# Patient Record
Sex: MALE | Race: WHITE | NOT HISPANIC OR LATINO | Employment: OTHER | URBAN - NONMETROPOLITAN AREA
[De-identification: names, ages, dates, MRNs, and addresses within clinical notes are randomized per-mention and may not be internally consistent; named-entity substitution may affect disease eponyms.]

---

## 2023-05-09 ENCOUNTER — OFFICE VISIT (OUTPATIENT)
Dept: PRIMARY CARE | Facility: CLINIC | Age: 83
End: 2023-05-09
Payer: MEDICARE

## 2023-05-09 VITALS
TEMPERATURE: 98.6 F | HEART RATE: 75 BPM | DIASTOLIC BLOOD PRESSURE: 70 MMHG | OXYGEN SATURATION: 96 % | WEIGHT: 190.2 LBS | SYSTOLIC BLOOD PRESSURE: 129 MMHG | RESPIRATION RATE: 16 BRPM

## 2023-05-09 DIAGNOSIS — Z00.00 ENCOUNTER FOR MEDICAL EXAMINATION TO ESTABLISH CARE: Primary | ICD-10-CM

## 2023-05-09 DIAGNOSIS — M25.511 RIGHT SHOULDER PAIN, UNSPECIFIED CHRONICITY: ICD-10-CM

## 2023-05-09 DIAGNOSIS — G45.9 TRANSIENT CEREBRAL ISCHEMIA, UNSPECIFIED TYPE: ICD-10-CM

## 2023-05-09 DIAGNOSIS — D48.5 NEOPLASM OF UNCERTAIN BEHAVIOR OF SCALP: ICD-10-CM

## 2023-05-09 DIAGNOSIS — H69.93 DYSFUNCTION OF BOTH EUSTACHIAN TUBES: ICD-10-CM

## 2023-05-09 DIAGNOSIS — E78.5 HYPERLIPIDEMIA, UNSPECIFIED HYPERLIPIDEMIA TYPE: ICD-10-CM

## 2023-05-09 PROBLEM — C61 PROSTATE CANCER (MULTI): Status: ACTIVE | Noted: 2018-05-10

## 2023-05-09 PROCEDURE — 1160F RVW MEDS BY RX/DR IN RCRD: CPT | Performed by: FAMILY MEDICINE

## 2023-05-09 PROCEDURE — 1159F MED LIST DOCD IN RCRD: CPT | Performed by: FAMILY MEDICINE

## 2023-05-09 PROCEDURE — 99214 OFFICE O/P EST MOD 30 MIN: CPT | Performed by: FAMILY MEDICINE

## 2023-05-09 PROCEDURE — 1036F TOBACCO NON-USER: CPT | Performed by: FAMILY MEDICINE

## 2023-05-09 RX ORDER — CLOPIDOGREL BISULFATE 75 MG/1
TABLET ORAL
COMMUNITY

## 2023-05-09 RX ORDER — GLUC/MSM/COLGN2/HYAL/ANTIARTH3 375-375-20
1 TABLET ORAL 3 TIMES DAILY
COMMUNITY

## 2023-05-09 RX ORDER — ASCORBIC ACID 250 MG
250 TABLET ORAL
COMMUNITY

## 2023-05-09 RX ORDER — TURMERIC 400 MG
CAPSULE ORAL
COMMUNITY

## 2023-05-09 RX ORDER — AMPICILLIN TRIHYDRATE 250 MG
CAPSULE ORAL
COMMUNITY

## 2023-05-09 ASSESSMENT — PAIN SCALES - GENERAL: PAINLEVEL: 0-NO PAIN

## 2023-05-09 ASSESSMENT — PATIENT HEALTH QUESTIONNAIRE - PHQ9
SUM OF ALL RESPONSES TO PHQ9 QUESTIONS 1 AND 2: 0
2. FEELING DOWN, DEPRESSED OR HOPELESS: NOT AT ALL
1. LITTLE INTEREST OR PLEASURE IN DOING THINGS: NOT AT ALL

## 2023-05-09 ASSESSMENT — ENCOUNTER SYMPTOMS: DIFFICULTY URINATING: 1

## 2023-05-09 NOTE — ASSESSMENT & PLAN NOTE
Episodic, resolves with Casa Maneuver (osteopathic manipulation)  Continue with conservative measures at this time  Patient established with ENT, he will return to them to discuss next steps for mucus production since unable to tolerate nasal spray due to epistaxis.

## 2023-05-09 NOTE — PATIENT INSTRUCTIONS
SCALP LESION  Referral to dermatology placed today    ETD/MUCUS PRODUCTION  Patient to return to ENT regarding mucus/nose issues.  Increase water intake to 64 ounces per day to help thin secretions.    For ophthalmology, recommend looking to see if Dr. Marquis is covered by insurance.    RIGHT SHOULDER PAIN  Exam and history consistent with arthritis and impingement syndrome  Patient reports symptoms overall improving, wishes to continue with conservative management at this time.  Activity modification and rest discussed and reviewed with patient.  Physical therapy recommended, patient can call for order if he wishes to pursue.

## 2023-05-09 NOTE — PROGRESS NOTES
Subjective   Patient ID: Tiburcio Bliss is a 83 y.o. male who presents for New Patient Visit (Former pt of Dr. Jayne Guillaume for 30+ years until her recent residential), Difficulty Urinating (Weak flow-seeing urology Dr. Costello/Scheduled for cytoscopy on 05/16/2023), Shoulder Pain (Right shoulder decreased ROM), and Hair/Scalp Problem.    HPI     Patient is here at new patient for establishment of care.  He was previous patient of Dr. Jayne Guillaume for over 30 years until her residential.    He has several concerns he wishes to discuss today:    Mucus, states has increased amounts of nasal secretions. He reports slimy is nature. Notes history of allergies, no relief with OTC antihistamine in the past. Notes hx of epistaxis that bleed profusely, required EMS/ER visit. Now only uses salt spray. He is established with ENT but cannot recall name at this time.  Right shoulder, notes mild injury x 10 days ago in which he heard popping noise when it happening. Now having decreased ROM.  Scalp, has raised area with flaking that has been present for quite some time. No pain or irritation other than when using comb. No bleeding other than when he experiences trauma to the area. Otherwise not bothersome. He does not have dermatologist that he follows with.  Ears issue. He notes he has occasional sound of scratching/static that occurs episodically in both ears. Occurs in the ear on the side of the head he was laying down on, resolves with massaging of ear.  Difficulty urinating, weak flow-seeing urology Dr. Costello. He is scheduled for cytoscopy on 05/16/2023.      Review of Systems   HENT:  Positive for congestion and tinnitus.    Genitourinary:  Positive for difficulty urinating.   Musculoskeletal:         Positive for right shoulder pain.   Skin:         Positive for scalp lesion   All other systems reviewed and are negative.      Objective   /70 (BP Location: Right arm, Patient Position: Sitting, BP Cuff Size:  Adult)   Pulse 75   Temp 37 °C (98.6 °F)   Resp 16   Wt 86.3 kg (190 lb 3.2 oz)   SpO2 96%     Physical Exam  Vitals and nursing note reviewed.   Constitutional:       General: He is not in acute distress.     Appearance: Normal appearance. He is not toxic-appearing.   HENT:      Head: Normocephalic and atraumatic.   Eyes:      Extraocular Movements: Extraocular movements intact.      Pupils: Pupils are equal, round, and reactive to light.   Neck:      Thyroid: No thyromegaly.      Vascular: No hepatojugular reflux or JVD.   Cardiovascular:      Rate and Rhythm: Normal rate and regular rhythm.      Heart sounds: No murmur heard.     No friction rub. No gallop.   Pulmonary:      Effort: Pulmonary effort is normal.      Breath sounds: Normal breath sounds. No wheezing, rhonchi or rales.   Abdominal:      General: Bowel sounds are normal. There is no distension.      Palpations: Abdomen is soft. There is no mass.      Tenderness: There is no abdominal tenderness. There is no guarding.   Musculoskeletal:      Right lower le+ Edema present.      Left lower le+ Edema present.      Comments:   Right Shoulder:  Negative Hawkin's  Mildly positive Neer  Strength 5/5 external rotation and sub scapularis lift off  Pain with 90 abduction, and abduction plus extension.   Lymphadenopathy:      Cervical: No cervical adenopathy.   Skin:     General: Skin is warm and dry.      Comments: 4 mm papule pinkish/flesh colored central scale to top of scalp, no vascularity.   Neurological:      General: No focal deficit present.      Mental Status: He is alert and oriented to person, place, and time.   Psychiatric:         Mood and Affect: Mood normal.         Behavior: Behavior normal.      Comments: Somewhat irritable, patient reports at baseline         Assessment/Plan   Problem List Items Addressed This Visit          Circulatory    Transient cerebral ischemia     Occurred May 2018, continue Plavix anticoagulation.  Routine  blood work to be done prior to next OV.         Relevant Orders    Follow Up In [facility] - PCP    CBC    Comprehensive Metabolic Panel       Musculoskeletal    Right shoulder pain     Exam and history consistent with arthritis and impingement syndrome  Patient reports symptoms overall improving, wishes to continue with conservative management at this time.  Activity modification and rest discussed and reviewed with patient.  Physical therapy recommended, patient can call for order if he wishes to pursue.            Other    Hyperlipidemia     Lifestyle managed, lipid panel ordered to be done prior to next OV.         Relevant Orders    Follow Up In [facility] - PCP    Lipid Panel    Neoplasm of uncertain behavior of scalp     Dermatology referral placed today.         Relevant Orders    Referral to Dermatology    Dysfunction of both eustachian tubes     Episodic, resolves with Casa Maneuver (osteopathic manipulation)  Continue with conservative measures at this time  Patient established with ENT, he will return to them to discuss next steps for mucus production since unable to tolerate nasal spray due to epistaxis.           Other Visit Diagnoses       Encounter for medical examination to establish care    -  Primary          Follow-up in 6 months for routine care.  Labs to be done prior.  Call for sooner follow-up if needed.     Time Spent  Prep time on day of patient encounter: 3 minutes  Time spent directly with patient, family or caregiver: 22 minutes  Additional Time Spent on Patient Care Activities: 0 minutes  Documentation Time: 6 minutes  Other Time Spent: 0 minutes  Total: 31 minutes      Scribe Attestation  By signing my name below, IMercedes Scribe   attest that this documentation has been prepared under the direction and in the presence of Akilah Caraballo DO.

## 2023-05-09 NOTE — ASSESSMENT & PLAN NOTE
Exam and history consistent with arthritis and impingement syndrome  Patient reports symptoms overall improving, wishes to continue with conservative management at this time.  Activity modification and rest discussed and reviewed with patient.  Physical therapy recommended, patient can call for order if he wishes to pursue.

## 2023-05-09 NOTE — ASSESSMENT & PLAN NOTE
Occurred May 2018, continue Plavix anticoagulation.  Routine blood work to be done prior to next OV.

## 2023-11-13 RX ORDER — TAMSULOSIN HYDROCHLORIDE 0.4 MG/1
0.4 CAPSULE ORAL
COMMUNITY
End: 2023-11-14 | Stop reason: WASHOUT

## 2023-11-14 ENCOUNTER — OFFICE VISIT (OUTPATIENT)
Dept: PRIMARY CARE | Facility: CLINIC | Age: 83
End: 2023-11-14
Payer: MEDICARE

## 2023-11-14 VITALS
WEIGHT: 187 LBS | RESPIRATION RATE: 16 BRPM | DIASTOLIC BLOOD PRESSURE: 73 MMHG | HEART RATE: 91 BPM | SYSTOLIC BLOOD PRESSURE: 111 MMHG | TEMPERATURE: 98.7 F | OXYGEN SATURATION: 94 %

## 2023-11-14 DIAGNOSIS — G45.9 TRANSIENT CEREBRAL ISCHEMIA, UNSPECIFIED TYPE: ICD-10-CM

## 2023-11-14 DIAGNOSIS — C61 PROSTATE CANCER (MULTI): ICD-10-CM

## 2023-11-14 DIAGNOSIS — R45.4 IRRITABILITY: ICD-10-CM

## 2023-11-14 DIAGNOSIS — R41.3 MEMORY IMPAIRMENT: ICD-10-CM

## 2023-11-14 DIAGNOSIS — H69.93 DYSFUNCTION OF BOTH EUSTACHIAN TUBES: ICD-10-CM

## 2023-11-14 DIAGNOSIS — E78.5 HYPERLIPIDEMIA, UNSPECIFIED HYPERLIPIDEMIA TYPE: Primary | ICD-10-CM

## 2023-11-14 DIAGNOSIS — M25.511 RIGHT SHOULDER PAIN, UNSPECIFIED CHRONICITY: ICD-10-CM

## 2023-11-14 DIAGNOSIS — I63.131: ICD-10-CM

## 2023-11-14 DIAGNOSIS — L82.1 SEBORRHEIC KERATOSIS: ICD-10-CM

## 2023-11-14 PROBLEM — N20.0 RENAL CALCULUS, LEFT: Status: ACTIVE | Noted: 2023-01-10

## 2023-11-14 PROBLEM — N20.1 RIGHT URETERAL CALCULUS: Status: ACTIVE | Noted: 2023-01-10

## 2023-11-14 PROCEDURE — 1159F MED LIST DOCD IN RCRD: CPT | Performed by: FAMILY MEDICINE

## 2023-11-14 PROCEDURE — 1036F TOBACCO NON-USER: CPT | Performed by: FAMILY MEDICINE

## 2023-11-14 PROCEDURE — 1126F AMNT PAIN NOTED NONE PRSNT: CPT | Performed by: FAMILY MEDICINE

## 2023-11-14 PROCEDURE — 1160F RVW MEDS BY RX/DR IN RCRD: CPT | Performed by: FAMILY MEDICINE

## 2023-11-14 PROCEDURE — 99214 OFFICE O/P EST MOD 30 MIN: CPT | Performed by: FAMILY MEDICINE

## 2023-11-14 NOTE — ASSESSMENT & PLAN NOTE
Exam and history consistent with arthritis, impingement syndrome, and possible tear or partial tear of rotator cuff. Pain is not improved from prior visit in May 2023 with rest and activity modification.    Ortho referral placed today.  Physical therapy recommended, referral placed today.  X-ray of right shoulder ordered today.

## 2023-11-14 NOTE — PROGRESS NOTES
Subjective   Patient ID: Tiburcio Bliss is a 83 y.o. male who presents for Follow-up (Pt presents for 6 month follow up- pt states that he is still having some shoulder pain, pt thinks that his hearing/memory are going out. ) and Flu Vaccine (Pt declines flu vaccine at this time. ).    HPI     Patient presents today for routine 6 month follow-up.  Patient would like to review labs.  Patient declines all vaccines.    Here with concern of memory decline. Does not recall having a prior work-up for reversible causes of memory decline. He follows regularly with neurology at [facility], goes every 6 months due to hx of carotid emboli, gets ultrasound with them. Did effect his peripheral vision but overall stable.    Continues with some irritability, states does have fights with his wife which does cause frustration on both ends. He is open to counseling, disinclined to pursue medication at this time.    He reports wife is concerned about patient's hearing, feels he is experiencing hearing loss, patient feels his hearing is fine but does feel he is having some cerumen impaction on right side, hears crackling sensation.    Reports post-nasal drainage and notable mucus production that is constant. He does not feel allergies are problematic, notes history but feels these have abated for the most part. Rare, occasional sneezing. He used Flonase once in past but not with consistency.    Patient has lesion to L ear he would like checked out.  Noticed a few weeks ago.  No pain, not bothersome.    He also notes several areas of skin damage.  Saw derm this past year for scalp lesion but they did not do skin cancer screening.    He continues with some right shoulder pain and wants to see about getting this fixed. Notes prior injury, caught his weight on this right arm when doing some cleaning, heard a tear, felt was getting better when seen in May 2023. Does find right arm is weaker than left, limited due to pain. Has trouble  putting shirt on and has impacted his golf swing. Recently started trying weights back, unable to do more than 2 lbs due to pain. No prior imaging, disinclined to have steroid injections.    Former, very remote smoker, over 60 years ago.  EtOH of about 1 drink per week on average  [facility]    ROUTINE VISIT  CHRONIC CONDITIONS:    -HLD  Lifestyle managed    -Hx of embolic stroke involving right carotid artery    -Hx of transient cerebral ischemia  Occurred May 2018  On Plavix anticoagulation    -Hx of kidney stones  5/2023 stone analysis showed calcium oxalate    -Hx of prostate cancer  Diagnosed ~12 years ago, s/p resection.  6/2022 PSA 0.02    Review of Systems   All other systems reviewed and are negative.      Objective   /73 (BP Location: Left arm, Patient Position: Sitting, BP Cuff Size: Small adult)   Pulse 91   Temp 37.1 °C (98.7 °F) (Temporal)   Resp 16   Wt 84.8 kg (187 lb)   SpO2 94%     Physical Exam  Vitals and nursing note reviewed.   Constitutional:       General: He is not in acute distress.     Appearance: Normal appearance. He is not toxic-appearing.   HENT:      Head: Normocephalic and atraumatic.   Neck:      Thyroid: No thyromegaly.      Vascular: No hepatojugular reflux or JVD.   Cardiovascular:      Rate and Rhythm: Normal rate and regular rhythm.      Heart sounds: No murmur heard.     No friction rub. No gallop.   Pulmonary:      Effort: Pulmonary effort is normal.      Breath sounds: Normal breath sounds. No wheezing, rhonchi or rales.   Musculoskeletal:      Right lower leg: No edema.      Left lower leg: No edema.      Comments:   Right Shoulder:  Positive Hawkin's  Positive Neer  Strength 5/5 external rotation and sub scapularis lift off  Pain with 90 abduction, and abduction plus extension.  Suprascapular wasting noted.   Lymphadenopathy:      Cervical: No cervical adenopathy.   Skin:     Comments: Skin tear right forearm  Seborrheic keratosis above left  ear.   Neurological:      General: No focal deficit present.      Mental Status: He is alert and oriented to person, place, and time.   Psychiatric:         Mood and Affect: Mood normal.         Behavior: Behavior normal.      Comments: Somewhat irritable, patient reports at baseline         Assessment/Plan   Problem List Items Addressed This Visit             ICD-10-CM    Hyperlipidemia - Primary E78.5     Lifestyle managed, lipid panel ordered today.         Relevant Orders    Lipid Panel    Prostate cancer (CMS/[facility]) C61     Diagnosed ~12 years ago, s/p resection.  6/2022 PSA 0.02         Right shoulder pain M25.511     Exam and history consistent with arthritis, impingement syndrome, and possible tear or partial tear of rotator cuff. Pain is not improved from prior visit in May 2023 with rest and activity modification.    Ortho referral placed today.  Physical therapy recommended, referral placed today.  X-ray of right shoulder ordered today.         Relevant Orders    Referral to Orthopaedic Surgery    Referral to Physical Therapy    XR shoulder right 2+ views    Dysfunction of both eustachian tubes H69.93     Exam and history consistent with Eustachian Tube Dysfunction.  Suspect related to underlying allergies due to constant post-nasal drainage/mucus production.  Start Flonase nasal spray (available over-the-counter), 1 spray each nostril, twice daily.  Nasal sprays such as Flonase or Nasacort are fine to use daily.         Transient cerebral ischemia G45.9     Occurred May 2018, continue Plavix anticoagulation.  Routine blood work ordered today.         Relevant Orders    CBC    Comprehensive Metabolic Panel    Embolic stroke involving right carotid artery (CMS/[facility]) I63.131     Follows regularly (every 6 months) with neurology at [facility].  Reports gets imaging done via neuro, has been stable.  Some residual deficit in peripheral vision but this remains stable.  Continues with Plavix, also with hx of  transient cerebral ischemia.         Seborrheic keratosis L82.1     Numerous throughout his body, reassured that these are benign, no further evaluation necessary.  Has seen derm recently this year (2023), can return to them if he wishes to get a skin survey.         Irritability R45.4     Disinclined to pursue medication at this time but open to pursuing counseling.  Referral to psychology (counseling) placed today.    May consider looking into Gideon White @ [facility]  [facility]  <[email or web address]/>  [address] [place] [address]   Phone [phone]    If not covered, see other resources below:    [facility]  [address] [place] [address]  [phone]    [facility]  [email or web address]  [address]  [place] [address]  Phone [phone]    [facility]  [address] [address]  [phone]    [facility]  [address] [address]  [phone]     [facility]   [email or web address]   [address] [place] [address]  [phone]    [facility]  [address], [address], [place] [address]  [phone]    [facility]  <[email or web address]  [address]   Phone [phone]    [facility]  <[email or web address]  [address] [place] [address]  [phone]    [facility]  <[email or web address]   [address], [place] [address]  Phone: [phone]          Relevant Orders    Referral to Psychology    Memory impairment R41.3     Labs for reversible causes of memory ordered today.  Will do SLUMS upon rooming at follow-up.  We discussed effects of alcohol on memory/cognition, may consider abstaining for 3-4 weeks to see if  this has any impact for patient.         Relevant Orders    Vitamin B12    TSH with reflex to Free T4 if abnormal       Follow-up in 3 months for recheck memory, shoulder pain, chronic conditions, review labs.  SLUMS and scales upon rooming.  Call for sooner follow-up if needed.     Scribe Attestation  By signing my name below, IMercedes Scribe   attest that this documentation has been prepared under the direction and in the presence of Akilah Caraballo DO.

## 2023-11-14 NOTE — ASSESSMENT & PLAN NOTE
Labs for reversible causes of memory ordered today.  Will do SLUMS upon rooming at follow-up.  We discussed effects of alcohol on memory/cognition, may consider abstaining for 3-4 weeks to see if this has any impact for patient.

## 2023-11-14 NOTE — ASSESSMENT & PLAN NOTE
Follows regularly (every 6 months) with neurology at [facility].  Reports gets imaging done via neuro, has been stable.  Some residual deficit in peripheral vision but this remains stable.  Continues with Plavix, also with hx of transient cerebral ischemia.

## 2023-11-14 NOTE — ASSESSMENT & PLAN NOTE
Disinclined to pursue medication at this time but open to pursuing counseling.  Referral to psychology (counseling) placed today.    May consider looking into Gideon White @ [employer]  [facility]  <[email or web address]  [address] [place] [address]   Phone [phone]    If not covered, see other resources below:    [facility]  [address] [place] [address]  [phone]    [facility]  [email or web address]  [address]  [place] [address]  Phone [phone]    [facility]  [address] [address]  [phone]    [facility]  [address] [address]  [phone]     [facility]   [email or web address]   [address] [place] [address]  [phone]    [facility]  [address] [place] [address], [address], [place] [address]  [phone]    [facility]  <[email or web address]  [address]   Phone [phone]    [facility]  <[email or web address]  [address] [place] [address]  [phone]    [facility]  <[email or web address]   [address] [address]  Phone: [phone]

## 2023-11-14 NOTE — ASSESSMENT & PLAN NOTE
Numerous throughout his body, reassured that these are benign, no further evaluation necessary.  Has seen derm recently this year (2023), can return to them if he wishes to get a skin survey.

## 2023-11-14 NOTE — PATIENT INSTRUCTIONS
FASTING LABS AND R SHOULDER X-RAY TO BE DONE AT PATIENT'S CONVENIENCE  Follow-up visit in 3 months, will do memory screening at that time.    Dysfunction of both eustachian tubes  Exam and history consistent with Eustachian Tube Dysfunction.  Suspect related to underlying allergies due to constant post-nasal drainage/mucus production.  Start Flonase nasal spray (available over-the-counter), 1 spray each nostril, twice daily.  Nasal sprays such as Flonase or Nasacort are fine to use daily.    Right shoulder pain  Exam and history consistent with arthritis, impingement syndrome, and possible tear or partial tear of rotator cuff. Pain is not improved from prior visit in May 2023 with rest and activity modification.    Ortho referral placed today.  Physical therapy recommended, referral placed today.  X-ray of right shoulder ordered today.    Seborrheic keratosis  Numerous throughout his body, reassured that these are benign, no further evaluation necessary.  Has seen derm recently this year (2023), can return to them if he wishes to get a skin survey.    Prostate cancer (CMS/[facility])  Diagnosed ~12 years ago, s/p resection.  6/2022 PSA 0.02    Irritability  Disinclined to pursue medication at this time but open to pursuing counseling.  Referral to psychology (counseling) placed today.    May consider looking into Gideon White @ [employer]  [facility]  <[email or web address]/>  [address] [place] [address]   Phone [phone]    If not covered, see other resources below:    [facility]  [address] [place] [address]  [phone]    [facility]  [email or web address]  [address]  [place] [address]  Phone [phone]    [facility]  [address] [address]  [phone]    [facility]  [address] [address]  [phone]     [facility]   [email or web address]   [address] [place] [address]  [phone]    [facility]  [address], [place] [address]  [phone]    [facility]  <[email or web address]  [address]   Phone [phone]    [facility]  <[email or web address]  [address] [place] [address]  [phone]    [facility]  <[email or web address]   [address], [place] [address]  Phone: [phone]     Memory impairment  Labs for reversible causes of memory ordered today.  Will do SLUMS upon rooming at follow-up.  We discussed effects of alcohol on memory/cognition, may consider abstaining for 3-4 weeks to see if this has any impact for patient.    Embolic stroke involving right carotid artery (CMS/HCC)  Follows regularly (every 6 months) with neurology at [facility].  Reports gets imaging done via neuro, has been stable.  Some residual deficit in peripheral vision but this remains stable.  Continues with Plavix, also with hx of transient cerebral ischemia.    Transient cerebral ischemia  Occurred May 2018, continue Plavix anticoagulation.  Routine blood work ordered today.    Hyperlipidemia  Lifestyle managed, lipid panel ordered today.

## 2023-11-14 NOTE — ASSESSMENT & PLAN NOTE
Exam and history consistent with Eustachian Tube Dysfunction.  Suspect related to underlying allergies due to constant post-nasal drainage/mucus production.  Start Flonase nasal spray (available over-the-counter), 1 spray each nostril, twice daily.  Nasal sprays such as Flonase or Nasacort are fine to use daily.

## 2023-11-20 ENCOUNTER — ANCILLARY PROCEDURE (OUTPATIENT)
Dept: RADIOLOGY | Facility: CLINIC | Age: 83
End: 2023-11-20
Payer: MEDICARE

## 2023-11-20 DIAGNOSIS — M25.511 RIGHT SHOULDER PAIN, UNSPECIFIED CHRONICITY: ICD-10-CM

## 2023-11-20 PROCEDURE — 73030 X-RAY EXAM OF SHOULDER: CPT | Mod: RIGHT SIDE | Performed by: RADIOLOGY

## 2023-11-20 PROCEDURE — 73030 X-RAY EXAM OF SHOULDER: CPT | Mod: RT,FY

## 2023-11-21 ENCOUNTER — LAB (OUTPATIENT)
Dept: LAB | Facility: LAB | Age: 83
End: 2023-11-21
Payer: MEDICARE

## 2023-11-21 DIAGNOSIS — M19.019 SHOULDER ARTHRITIS: Primary | ICD-10-CM

## 2023-11-21 DIAGNOSIS — E78.5 HYPERLIPIDEMIA, UNSPECIFIED HYPERLIPIDEMIA TYPE: ICD-10-CM

## 2023-11-21 DIAGNOSIS — G45.9 TRANSIENT CEREBRAL ISCHEMIA, UNSPECIFIED TYPE: ICD-10-CM

## 2023-11-21 DIAGNOSIS — R41.3 MEMORY IMPAIRMENT: ICD-10-CM

## 2023-11-21 LAB
ERYTHROCYTE [DISTWIDTH] IN BLOOD BY AUTOMATED COUNT: 12.6 % (ref 11.5–14.5)
HCT VFR BLD AUTO: 45.4 % (ref 41–52)
HGB BLD-MCNC: 14.3 G/DL (ref 13.5–17.5)
MCH RBC QN AUTO: 31.5 PG (ref 26–34)
MCHC RBC AUTO-ENTMCNC: 31.5 G/DL (ref 32–36)
MCV RBC AUTO: 100 FL (ref 80–100)
NRBC BLD-RTO: 0 /100 WBCS (ref 0–0)
PLATELET # BLD AUTO: 188 X10*3/UL (ref 150–450)
RBC # BLD AUTO: 4.54 X10*6/UL (ref 4.5–5.9)
WBC # BLD AUTO: 4.1 X10*3/UL (ref 4.4–11.3)

## 2023-11-21 PROCEDURE — 36415 COLL VENOUS BLD VENIPUNCTURE: CPT

## 2023-11-21 PROCEDURE — 80061 LIPID PANEL: CPT

## 2023-11-21 PROCEDURE — 84443 ASSAY THYROID STIM HORMONE: CPT

## 2023-11-21 PROCEDURE — 85027 COMPLETE CBC AUTOMATED: CPT

## 2023-11-21 PROCEDURE — 82607 VITAMIN B-12: CPT

## 2023-11-21 PROCEDURE — 80053 COMPREHEN METABOLIC PANEL: CPT

## 2023-11-22 LAB
ALBUMIN SERPL BCP-MCNC: 4.2 G/DL (ref 3.4–5)
ALP SERPL-CCNC: 70 U/L (ref 33–136)
ALT SERPL W P-5'-P-CCNC: 19 U/L (ref 10–52)
ANION GAP SERPL CALC-SCNC: 13 MMOL/L (ref 10–20)
AST SERPL W P-5'-P-CCNC: 19 U/L (ref 9–39)
BILIRUB SERPL-MCNC: 0.5 MG/DL (ref 0–1.2)
BUN SERPL-MCNC: 16 MG/DL (ref 6–23)
CALCIUM SERPL-MCNC: 10 MG/DL (ref 8.6–10.6)
CHLORIDE SERPL-SCNC: 107 MMOL/L (ref 98–107)
CHOLEST SERPL-MCNC: 185 MG/DL (ref 0–199)
CHOLESTEROL/HDL RATIO: 2.9
CO2 SERPL-SCNC: 28 MMOL/L (ref 21–32)
CREAT SERPL-MCNC: 0.95 MG/DL (ref 0.5–1.3)
GFR SERPL CREATININE-BSD FRML MDRD: 80 ML/MIN/1.73M*2
GLUCOSE SERPL-MCNC: 86 MG/DL (ref 74–99)
HDLC SERPL-MCNC: 64.7 MG/DL
LDLC SERPL CALC-MCNC: 110 MG/DL
NON HDL CHOLESTEROL: 120 MG/DL (ref 0–149)
POTASSIUM SERPL-SCNC: 4.2 MMOL/L (ref 3.5–5.3)
PROT SERPL-MCNC: 6.3 G/DL (ref 6.4–8.2)
SODIUM SERPL-SCNC: 144 MMOL/L (ref 136–145)
TRIGL SERPL-MCNC: 53 MG/DL (ref 0–149)
TSH SERPL-ACNC: 2.86 MIU/L (ref 0.44–3.98)
VIT B12 SERPL-MCNC: 245 PG/ML (ref 211–911)
VLDL: 11 MG/DL (ref 0–40)

## 2023-11-28 NOTE — PROGRESS NOTES
Physical Therapy    Physical Therapy Upper Extremity Evaluation    Patient Name: Tiburcio Bliss  MRN: [id number]  Today's Date: 11/29/2023  Time Calculation  Start Time: 1135  Stop Time: 1215  Time Calculation (min): 40 min    Current Problem  Problem List Items Addressed This Visit             ICD-10-CM    Right shoulder pain - Primary M25.511    Relevant Orders    Follow Up In Physical Therapy          Precautions  Precautions  Precautions Comment: None       Pain  Pain Assessment: 0-10  Pain Score: 0 - No pain  Pain Location: Shoulder  Pain at worst: 9-10/10    SUBJECTIVE:   Chief complaint:  Pt referred to PT for R shoulder pain   Began in spring 2023  He was cleaning bathroom and was planted on RUE and turned away. States he felt a tear  Pain is localized to posterolateral aspect of his shoulder   Hx of impingement   Decreased ROM     Ortho referral was placed by PCP as well. He will see them if PT does not help     Pt does not want to have any injections or surgery    Hand dominance:   L handed     Aggravating factors:  Reaching up, behind back, threading belt.     Alleviating factors:    Imaging:  XR R shoulder 11/20/23  FINDINGS:   Right shoulder, three views       There is severe joint space narrowing osteophytosis in the AC and   glenohumeral joints. There is no fracture or dislocation     Severe degenerative change about the right shoulder       Prior level of function:   Previously independent with all activity     Functional limitations:  Dressing, reaching, lifting     Work:  Retired     Patients goal:  Avoid surgery, reduce pain   Improve golf swing-improve follow through    Prior tx:  No tx recently  Performs arm exercises at home including supine flexion, chest press, flys and ER with 2.5 lb weight  OBJECTIVE:    Upper Extremity ROM: (WNL unless documented below)   ROM in Degrees RIGHT LEFT   Shoulder Flexion Mild pain     Shoulder Abduction Mild pain     Shoulder ER 40 pain     Shoulder IR T 9+       Upper Extremity Strength: (WNL unless documented below)   MMT 5/5 max  RIGHT LEFT   Shoulder Flexion 4+ pain     Shoulder Abduction 4 pain     Shoulder ER 4 pain     Shoulder IR 4+      Palpation: TTP over infraspinatus insertion     Special tests: (WNL unless documented below)   SHOULDER RIGHT LEFT   Hawkin's-Krystian +    Neer Impingement  -    Empty Can (Supraspinatus) +    Full Can (Supraspinatus)  +    Lateral Sonia Test (Supraspinatus)     Painful arc  -    Drop arm (Supra and Infra)     Speeds test (Bicep/Labrum)     Belly Press (Subscapularis)     Lift off (Subscapularis)     Apprehension (Anterior instability)     Relocation      Cross-body adduction (AC)     Jerk test (Posterior instability)      Crank Test/Isabel Test (Labrum)          TREATMENT:  Initial evaluation completed. Issued and reviewed HEP with pt that included:  Access Code: [email or web address]  URL: [email or web address]  Date: 11/29/2023  Prepared by: Shaila Luciano    Exercises  - Supine Shoulder Flexion with Free Weight  - 1 x daily - 7 x weekly - 2-3 sets - 10 reps  - Supine Shoulder Horizontal Abduction with Dumbbells  - 1 x daily - 7 x weekly - 2-3 sets - 10 reps  - Sidelying Shoulder ER with Towel and Dumbbell  - 1 x daily - 7 x weekly - 2-3 sets - 10 reps  - Seated Shoulder External Rotation AAROM with Dowel (Mirrored)  - 1 x daily - 7 x weekly - 2-3 sets - 10 reps  - Scaption with Dumbbells  - 1 x daily - 7 x weekly - 2-3 sets - 10 reps  - Standing Shoulder Abduction with Dumbbells  - 1 x daily - 7 x weekly - 2-3 sets - 10 reps    Patient Education  - Shoulder Impingement    Outcome Measure:  Other Measures  Disability of Arm Shoulder Hand (DASH): 18.18    ASSESSEMENT  The pt presents with signs and symptoms consistent with the physical therapy diagnosis of R shoulder pain that has been present since the spring. He had an incident where he was cleaning the bathroom and he was planted on his RUE and turned his body. Pt  demonstrates slight decrease in range with pain. He also has pain and weakness in RUE with +tests and measures consistent with impingement. Xrays revealed severe degenerative changes.   The pt will benefit from a therapy program to restore prior level of function, reduce pain, increase AROM, increase strength, and improve posture.    The physical therapy prognosis is good for the patient to achieve their goals.   The pt tolerated therapy treatment today well with no adverse effects.  Barriers to therapy include:  None    PLAN  The pt will be seen 1 time(s) a week in 2 weeks at which point he would like to continue with HEP independently    The pt has been educated about the risks and benefits of physical therapy including manual therapy treatments and gives consent for treatment.     The patient will benefit from physical therapy treatment to include: therapeutic exercises, therapeutic activities, neurological re-education, manual therapy, modalities, and a home exercise program.       Goals:  Active       PT Problem       Reduce pain at worst to 3/10 with all functional and recreational activity.        Start:  11/29/23    Expected End:  02/27/24            Increase ROM/flexibility to WFL to perform daily functional activities including dressing/bathing/grooming tasks       Start:  11/29/23    Expected End:  02/27/24            Increase by > or = 1/2 mm grade to improve lifting/carrying household objects, performing daily tasks without increased pain/compensation         Start:  11/29/23    Expected End:  02/27/24            Decrease Quick Dash score by > or = 8 points        Start:  11/29/23    Expected End:  02/27/24            Patient will demonstrate independence in home program for support of progression       Start:  11/29/23    Expected End:  02/27/24

## 2023-11-29 ENCOUNTER — EVALUATION (OUTPATIENT)
Dept: PHYSICAL THERAPY | Facility: CLINIC | Age: 83
End: 2023-11-29
Payer: MEDICARE

## 2023-11-29 DIAGNOSIS — M25.511 RIGHT SHOULDER PAIN, UNSPECIFIED CHRONICITY: Primary | ICD-10-CM

## 2023-11-29 PROCEDURE — 97161 PT EVAL LOW COMPLEX 20 MIN: CPT | Mod: GP | Performed by: PHYSICAL THERAPIST

## 2023-11-29 PROCEDURE — 97110 THERAPEUTIC EXERCISES: CPT | Mod: GP | Performed by: PHYSICAL THERAPIST

## 2023-11-29 ASSESSMENT — PATIENT HEALTH QUESTIONNAIRE - PHQ9
SUM OF ALL RESPONSES TO PHQ9 QUESTIONS 1 AND 2: 0
1. LITTLE INTEREST OR PLEASURE IN DOING THINGS: NOT AT ALL
2. FEELING DOWN, DEPRESSED OR HOPELESS: NOT AT ALL

## 2023-11-29 ASSESSMENT — PAIN SCALES - GENERAL: PAINLEVEL_OUTOF10: 0 - NO PAIN

## 2023-11-29 ASSESSMENT — PAIN - FUNCTIONAL ASSESSMENT: PAIN_FUNCTIONAL_ASSESSMENT: 0-10

## 2023-11-29 ASSESSMENT — ENCOUNTER SYMPTOMS
LOSS OF SENSATION IN FEET: 0
OCCASIONAL FEELINGS OF UNSTEADINESS: 0
DEPRESSION: 0

## 2023-12-13 ENCOUNTER — TREATMENT (OUTPATIENT)
Dept: PHYSICAL THERAPY | Facility: CLINIC | Age: 83
End: 2023-12-13
Payer: MEDICARE

## 2023-12-13 DIAGNOSIS — M25.511 RIGHT SHOULDER PAIN, UNSPECIFIED CHRONICITY: ICD-10-CM

## 2023-12-13 PROCEDURE — 97110 THERAPEUTIC EXERCISES: CPT | Mod: GP | Performed by: PHYSICAL THERAPIST

## 2023-12-13 ASSESSMENT — PAIN - FUNCTIONAL ASSESSMENT: PAIN_FUNCTIONAL_ASSESSMENT: 0-10

## 2023-12-13 NOTE — PROGRESS NOTES
Physical Therapy Treatment    Patient Name: Tiburcio Bliss  MRN: [id number]  Today's Date: 12/13/2023  Time Calculation  Start Time: 1132  Stop Time: 1215  Time Calculation (min): 43 min    Current Problem:  Problem List Items Addressed This Visit             ICD-10-CM    Right shoulder pain M25.511       Subjective   General:   Pt reports he did have some soreness with HEP that improved with rest.     Pain:  Pain Assessment: 0-10  Pain location: R shoulder    Precautions:  Precautions  Precautions Comment: None      Objective   No objective measures taken this visit    Treatment:    Therapeutic exercise  UBE L3 2 min/2 min   Pulleys flex, abd   Tband ER/IR YTB/GTB 2x10  Scaption  Abduction     Rows BTB 2x10  Lat pulldowns GTB 2x10    HEP updated:    Access Code: [email or web address]  URL: [email or web address]  Date: 12/13/2023  Prepared by: Shaila Luciano    Exercises  - Supine Shoulder Flexion with Free Weight  - 1 x daily - 4-5 x weekly - 2-3 sets - 10 reps  - Supine Shoulder Horizontal Abduction with Dumbbells  - 1 x daily - 4-5 x weekly - 2-3 sets - 10 reps  - Sidelying Shoulder ER with Towel and Dumbbell  - 1 x daily - 4-5 x weekly - 2-3 sets - 10 reps  - Seated Shoulder External Rotation AAROM with Dowel (Mirrored)  - 1 x daily - 4-5 x weekly - 2-3 sets - 10 reps  - Scaption with Dumbbells  - 1 x daily - 4-5 x weekly - 2-3 sets - 10 reps  - Standing Shoulder Abduction with Dumbbells  - 1 x daily - 4-5 x weekly - 2-3 sets - 10 reps  - Shoulder Internal Rotation with Resistance  - 1 x daily - 4-5 x weekly - 2-3 sets - 10 reps  - Shoulder External Rotation with Anchored Resistance (Mirrored)  - 1 x daily - 4-5 x weekly - 2-3 sets - 10 reps  - Standing Shoulder Row with Anchored Resistance  - 1 x daily - 4-5 x weekly - 2-3 sets - 10 reps  - Seated Lat Pull Down with Resistance - Elbows Bent  - 1 x daily - 4-5 x weekly - 2-3 sets - 10 reps    Assessment  Pt did well with exercise progression. Updated  and reviewed HEP with pt. Notes some R back strain with some exercises but did well otherwise. Pt motivated to continue to work on strength and ROM.     Plan  Continue to progress POC as tolerated by patient to improve strength, mobility and overall function    Goals:  Active       PT Problem       Reduce pain at worst to 3/10 with all functional and recreational activity.        Start:  11/29/23    Expected End:  02/27/24            Increase ROM/flexibility to WFL to perform daily functional activities including dressing/bathing/grooming tasks       Start:  11/29/23    Expected End:  02/27/24            Increase by > or = 1/2 mm grade to improve lifting/carrying household objects, performing daily tasks without increased pain/compensation         Start:  11/29/23    Expected End:  02/27/24            Decrease Quick Dash score by > or = 8 points        Start:  11/29/23    Expected End:  02/27/24            Patient will demonstrate independence in home program for support of progression       Start:  11/29/23    Expected End:  02/27/24

## 2024-01-30 ENCOUNTER — DOCUMENTATION (OUTPATIENT)
Dept: PHYSICAL THERAPY | Facility: CLINIC | Age: 84
End: 2024-01-30
Payer: MEDICARE

## 2024-01-30 NOTE — PROGRESS NOTES
Denies known Latex allergy or symptoms of Latex sensitivity.  Medications reviewed and updated.     Physical Therapy    Discharge Summary    Name: Tiburcio Bliss  MRN: [id number]  : 1940  Date: 24    Discharge Summary: PT    Discharge Information: Date of discharge 24, Date of last visit 23, Date of evaluation 23, Number of attended visits 2, Referred by Dr. Oliva, and Referred for R shoulder pain       Rehab Discharge Reason: Other Pt has not scheduled any additional PT appts. Pt was doing HEP as well as other exercises at home

## 2024-03-14 ENCOUNTER — APPOINTMENT (OUTPATIENT)
Dept: PRIMARY CARE | Facility: CLINIC | Age: 84
End: 2024-03-14
Payer: MEDICARE

## 2024-04-11 PROBLEM — Z85.46 HISTORY OF PROSTATE CANCER: Status: ACTIVE | Noted: 2018-05-10

## 2024-04-11 PROBLEM — Z86.73 CHRONIC RIGHT ARTERIAL ISCHEMIC STROKE, MCA (MIDDLE CEREBRAL ARTERY): Status: ACTIVE | Noted: 2018-05-20

## 2024-04-12 ENCOUNTER — APPOINTMENT (OUTPATIENT)
Dept: PRIMARY CARE | Facility: CLINIC | Age: 84
End: 2024-04-12
Payer: MEDICARE

## 2024-04-12 ENCOUNTER — OFFICE VISIT (OUTPATIENT)
Dept: PRIMARY CARE | Facility: CLINIC | Age: 84
End: 2024-04-12
Payer: MEDICARE

## 2024-04-12 VITALS
WEIGHT: 191.3 LBS | SYSTOLIC BLOOD PRESSURE: 142 MMHG | TEMPERATURE: 97.8 F | HEART RATE: 68 BPM | OXYGEN SATURATION: 95 % | DIASTOLIC BLOOD PRESSURE: 71 MMHG

## 2024-04-12 DIAGNOSIS — R41.3 MEMORY IMPAIRMENT: ICD-10-CM

## 2024-04-12 DIAGNOSIS — D72.819 LEUKOPENIA, UNSPECIFIED TYPE: ICD-10-CM

## 2024-04-12 DIAGNOSIS — R45.4 IRRITABILITY: ICD-10-CM

## 2024-04-12 DIAGNOSIS — G45.9 TRANSIENT CEREBRAL ISCHEMIA, UNSPECIFIED TYPE: ICD-10-CM

## 2024-04-12 DIAGNOSIS — Z86.73 CHRONIC RIGHT ARTERIAL ISCHEMIC STROKE, MCA (MIDDLE CEREBRAL ARTERY): Primary | ICD-10-CM

## 2024-04-12 DIAGNOSIS — E78.5 HYPERLIPIDEMIA, UNSPECIFIED HYPERLIPIDEMIA TYPE: ICD-10-CM

## 2024-04-12 DIAGNOSIS — Z85.46 HISTORY OF PROSTATE CANCER: ICD-10-CM

## 2024-04-12 PROCEDURE — 99214 OFFICE O/P EST MOD 30 MIN: CPT | Performed by: FAMILY MEDICINE

## 2024-04-12 PROCEDURE — 1160F RVW MEDS BY RX/DR IN RCRD: CPT | Performed by: FAMILY MEDICINE

## 2024-04-12 PROCEDURE — 1159F MED LIST DOCD IN RCRD: CPT | Performed by: FAMILY MEDICINE

## 2024-04-12 RX ORDER — SERTRALINE HYDROCHLORIDE 50 MG/1
TABLET, FILM COATED ORAL
Qty: 30 TABLET | Refills: 1 | Status: SHIPPED | OUTPATIENT
Start: 2024-04-12

## 2024-04-12 ASSESSMENT — ENCOUNTER SYMPTOMS
APPETITE CHANGE: 0
FATIGUE: 1

## 2024-04-12 NOTE — ASSESSMENT & PLAN NOTE
Follows regularly (every 6 months) with neurology at [facility].  Follows regularly with vascular for imaging of right ICA occlusion which has been historically stable.  Some residual deficit in peripheral vision but this remains stable.  Continues with Plavix  Revisited importance of good BP control.  Diet and exercise recommendations revisited.
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
## 2024-04-12 NOTE — PROGRESS NOTES
Subjective   Patient ID: Tiburcio Bliss is a 84 y.o. male who presents for No chief complaint on file..    HPI     Patient presents today for follow-up memory, shoulder pain, chronic conditions, review labs. SLUMS and scales to be done upon rooming today.    SLUMS:    PHQ-9:  XAVI-7:    Patient concerns:      CHRONIC CONDITIONS:    Memory impairment  11/2023 B-12 and TSH wnl    SLUMS to be completed today    Previously (11/2023 OV) discussed effects of alcohol on memory/cognition, may consider abstaining for 3-4 weeks to see if this has any impact for patient.    Irritability  Disinclined to pursue medication but open to pursuing counseling.  Referral to psychology (counseling) placed 11/14/2023    Hyperlipidemia  Lifestyle managed  Not on statin but takes red yeast rice.    11/2023 TC/HDL ratio 2.9, , trig 53     Chronic right arterial ischemic stroke, MCA (middle cerebral artery)  Follows with neurology at [facility]  On plavix anticoagulation.    5/10/2018: Right MCA stroke, right ICA occlusion, Left carotid stenosis    5/11/2018 MRI of Brain: acute infarcts basal ganglia, subinsular white matter, and temporal lobe white matter. Slow flow or occlusion of right ICA.    Per neurology adequate bp control = sbp 120-140' avoid hypotension due to risk of watershed with right ica occlusion.    Carotid artery occlusion, R  Follows with [facility] vascular, last visit 1/2024 and noted to be stable.     12/2023 carotid duplex showed Right ICA: Occluded and Left ICA: <50% stenosis. This is stable from previous testing.     Repeat carotid duplex 1 year per vascular    Hx of kidney stones  5/2023 stone analysis showed calcium oxalate     Hx of prostate cancer  Diagnosed ~12 years ago, s/p resection.  6/2022 PSA 0.02      Review of Systems   All other systems reviewed and are negative.      Objective   There were no vitals taken for this visit.    Physical Exam  Vitals and nursing note reviewed.   Constitutional:       General: He is  not in acute distress.     Appearance: Normal appearance. He is not toxic-appearing.   HENT:      Head: Normocephalic and atraumatic.   Eyes:      Extraocular Movements: Extraocular movements intact.      Pupils: Pupils are equal, round, and reactive to light.   Neck:      Thyroid: No thyromegaly.      Vascular: No hepatojugular reflux or JVD.   Cardiovascular:      Rate and Rhythm: Normal rate and regular rhythm.      Heart sounds: No murmur heard.     No friction rub. No gallop.   Pulmonary:      Effort: Pulmonary effort is normal.      Breath sounds: Normal breath sounds. No wheezing, rhonchi or rales.   Abdominal:      General: Bowel sounds are normal. There is no distension.      Palpations: Abdomen is soft. There is no mass.      Tenderness: There is no abdominal tenderness. There is no guarding.   Musculoskeletal:      Right lower le+ Edema present.      Left lower le+ Edema present.   Lymphadenopathy:      Cervical: No cervical adenopathy.   Skin:     General: Skin is warm and dry.   Neurological:      General: No focal deficit present.      Mental Status: He is alert and oriented to person, place, and time.   Psychiatric:         Mood and Affect: Mood normal.         Behavior: Behavior normal.      Comments: Somewhat irritable, patient reports at baseline         Assessment/Plan   {Assess/PlanSmartLinks:14039}

## 2024-04-12 NOTE — ASSESSMENT & PLAN NOTE
11/2023 TC/HDL ratio 2.9, , trig 53  Goal TC/HDL ratio 3.4 or less, LDL 99 or less,  or less, and TRIG 150 or less.     Lifestyle managed  Not on statin but takes red yeast rice.  Diet and exercise recommendations revisited.     To lower this with lifestyle measures:  -make sure you are avoiding refined carbs such as breads, pasta, cereal, candy, soda,  nutrition bars, granola, chips, and sugar sweetened beverages.      -eat 5- 7 servings daily of veggies,  healthy protein such as chicken, fish,  beans, and eggs, and include healthy fats in your diet such as seeds, nuts, olive oil, avocados, and salmon.   -exercise 4 - 6 days per week as you are able, 150 minutes total weekly divided up is recommended with 3-4 of those days including resistance/strength training.  -consider taking the fiber product psyllium capsules or powder 2 times daily (generic brand is fine) , or a brand name psyllium such as [employer] or Metamucil.  -consider also adding plant stanols and sterols such as Nature Made CholestOff, available over the counter.

## 2024-04-12 NOTE — LETTER
April 12, 2024     Patient: Tiburcio Bliss   YOB: 1940   Date of Visit: 4/12/2024       To Whom It May Concern:    Tiburcio Bliss was seen in my clinic on 4/12/2024 at 11:00 am. Please excuse Tiburcio for his absence from work on this day to make the appointment.    If you have any questions or concerns, please don't hesitate to call.         Sincerely,         Akilah Caraballo,         CC: No Recipients

## 2024-04-12 NOTE — ASSESSMENT & PLAN NOTE
11/2023 B-12 and TSH wnl - labs unremarkable for reversible causes of memory.    SLUMS in office today was **

## 2024-04-12 NOTE — PROGRESS NOTES
Patient has brought in BP machine for validation.  Arm tested:   Office Cuff size:  Step 1:  A Patient machine:       BP: 157/85                 B Patient machine:       BP:  150/85                C Office machine:        BP: 142/71                D Patient machine:      BP: 163/94               E Office machine:        BP: 154/60                    Step 2-average of B & D=156.5  Difference between Step 2 average and C reading=  If the difference is:14.5  Less than 5mm Hg, machine is validated  Between 6 and 10 Hg, proceed to step 3  Greater than 10mm Hg-replace device and return to office for validation    Step 3 (if applicable)-average of C and E=148  Difference between Step 3 average and D reading=  If the difference is:15  Less than or equal to 10mm Hg-machine is validated  Greater than 10mm Hg, replace device and return to office for validation       Patient informed BP cuff/machine is (VALID, INVALID): INVALID

## 2024-04-12 NOTE — PATIENT INSTRUCTIONS
Assessment/Plan        Slightly decreased white blood cell count-this can be seen in many healthy individuals as a normal variant.  Repeat blood work with differential has been ordered to look at blood cell lines today.    Reversible causes of memory loss have been looked at such as B12 and thyroid.  Those were in the normal range.    Irritability-counseling services are recommended for patient.  Patient is advised to call [facility] and relay to them that he would like to speak to someone about frustration from marital challenges.    Sertraline, which is Zoloft, has been sent into patient's pharmacy.  Patient is to take half of a tablet for the first 10 to 12 days, then a full tablet thereafter.  Refills have been provided.  Side effect profile was reviewed with patient.    Please make a follow-up office visit with me in 8 to 12 weeks for a recheck irritability/scales/catch up on counseling feedback, and for blood work review.    If your blood work is abnormal and needs clinical intervention prior to your follow-up, we will reach out and let you know.    PLEASE CALL [facility]  AND SET UP A SESSION FOR COUNSELING TO DISCUSS FRUSTRATIONS, COPING MECHANISMS, ETC     [phone]

## 2024-04-15 ENCOUNTER — TELEPHONE (OUTPATIENT)
Dept: PRIMARY CARE | Facility: CLINIC | Age: 84
End: 2024-04-15
Payer: MEDICARE

## 2024-04-15 DIAGNOSIS — R21 RASH: Primary | ICD-10-CM

## 2024-04-15 RX ORDER — TRIAMCINOLONE ACETONIDE 1 MG/G
CREAM TOPICAL 2 TIMES DAILY
Qty: 30 G | Refills: 0 | Status: SHIPPED | OUTPATIENT
Start: 2024-04-15

## 2024-04-15 ASSESSMENT — PATIENT HEALTH QUESTIONNAIRE - PHQ9
9. THOUGHTS THAT YOU WOULD BE BETTER OFF DEAD, OR OF HURTING YOURSELF: NOT AT ALL
SUM OF ALL RESPONSES TO PHQ9 QUESTIONS 1 AND 2: 2
2. FEELING DOWN, DEPRESSED OR HOPELESS: NOT AT ALL
6. FEELING BAD ABOUT YOURSELF - OR THAT YOU ARE A FAILURE OR HAVE LET YOURSELF OR YOUR FAMILY DOWN: SEVERAL DAYS
3. TROUBLE FALLING OR STAYING ASLEEP OR SLEEPING TOO MUCH: SEVERAL DAYS
4. FEELING TIRED OR HAVING LITTLE ENERGY: NOT AT ALL
8. MOVING OR SPEAKING SO SLOWLY THAT OTHER PEOPLE COULD HAVE NOTICED. OR THE OPPOSITE, BEING SO FIGETY OR RESTLESS THAT YOU HAVE BEEN MOVING AROUND A LOT MORE THAN USUAL: SEVERAL DAYS
10. IF YOU CHECKED OFF ANY PROBLEMS, HOW DIFFICULT HAVE THESE PROBLEMS MADE IT FOR YOU TO DO YOUR WORK, TAKE CARE OF THINGS AT HOME, OR GET ALONG WITH OTHER PEOPLE: SOMEWHAT DIFFICULT
7. TROUBLE CONCENTRATING ON THINGS, SUCH AS READING THE NEWSPAPER OR WATCHING TELEVISION: NOT AT ALL
5. POOR APPETITE OR OVEREATING: NOT AT ALL
1. LITTLE INTEREST OR PLEASURE IN DOING THINGS: MORE THAN HALF THE DAYS
SUM OF ALL RESPONSES TO PHQ QUESTIONS 1-9: 5

## 2024-04-15 ASSESSMENT — ANXIETY QUESTIONNAIRES
6. BECOMING EASILY ANNOYED OR IRRITABLE: SEVERAL DAYS
4. TROUBLE RELAXING: NOT AT ALL
3. WORRYING TOO MUCH ABOUT DIFFERENT THINGS: NOT AT ALL
IF YOU CHECKED OFF ANY PROBLEMS ON THIS QUESTIONNAIRE, HOW DIFFICULT HAVE THESE PROBLEMS MADE IT FOR YOU TO DO YOUR WORK, TAKE CARE OF THINGS AT HOME, OR GET ALONG WITH OTHER PEOPLE: SOMEWHAT DIFFICULT
7. FEELING AFRAID AS IF SOMETHING AWFUL MIGHT HAPPEN: NOT AT ALL
1. FEELING NERVOUS, ANXIOUS, OR ON EDGE: SEVERAL DAYS
GAD7 TOTAL SCORE: 3
2. NOT BEING ABLE TO STOP OR CONTROL WORRYING: SEVERAL DAYS
5. BEING SO RESTLESS THAT IT IS HARD TO SIT STILL: NOT AT ALL

## 2024-04-15 NOTE — TELEPHONE ENCOUNTER
Patient called and said that there was something supposed to be sent in for a rash he has and he never got it. Please send to the [facility] in [place].     He also said that he decided to not take the zoloft that was sent in because of the side effects.

## 2024-04-15 NOTE — TELEPHONE ENCOUNTER
Sent in sorry about that     Please reconsider the zoloft -  if you have side effects you just stop it and they stop       - up to him

## 2024-04-16 NOTE — TELEPHONE ENCOUNTER
Pt cb. I advised him the cream was sent to the pharmacy. He is not interested in starting the Zoloft at this time. Pt states it has to many side effects.

## 2024-04-22 ENCOUNTER — LAB (OUTPATIENT)
Dept: LAB | Facility: LAB | Age: 84
End: 2024-04-22
Payer: MEDICARE

## 2024-04-22 DIAGNOSIS — D72.819 LEUKOPENIA, UNSPECIFIED TYPE: ICD-10-CM

## 2024-04-22 PROCEDURE — 85025 COMPLETE CBC W/AUTO DIFF WBC: CPT

## 2024-04-22 PROCEDURE — 36415 COLL VENOUS BLD VENIPUNCTURE: CPT

## 2024-04-23 LAB
BASOPHILS # BLD AUTO: 0.04 X10*3/UL (ref 0–0.1)
BASOPHILS NFR BLD AUTO: 0.9 %
EOSINOPHIL # BLD AUTO: 0.18 X10*3/UL (ref 0–0.4)
EOSINOPHIL NFR BLD AUTO: 3.9 %
ERYTHROCYTE [DISTWIDTH] IN BLOOD BY AUTOMATED COUNT: 12.7 % (ref 11.5–14.5)
HCT VFR BLD AUTO: 47.4 % (ref 41–52)
HGB BLD-MCNC: 15.1 G/DL (ref 13.5–17.5)
IMM GRANULOCYTES # BLD AUTO: 0.02 X10*3/UL (ref 0–0.5)
IMM GRANULOCYTES NFR BLD AUTO: 0.4 % (ref 0–0.9)
LYMPHOCYTES # BLD AUTO: 0.94 X10*3/UL (ref 0.8–3)
LYMPHOCYTES NFR BLD AUTO: 20.5 %
MCH RBC QN AUTO: 31.4 PG (ref 26–34)
MCHC RBC AUTO-ENTMCNC: 31.9 G/DL (ref 32–36)
MCV RBC AUTO: 99 FL (ref 80–100)
MONOCYTES # BLD AUTO: 0.38 X10*3/UL (ref 0.05–0.8)
MONOCYTES NFR BLD AUTO: 8.3 %
NEUTROPHILS # BLD AUTO: 3.03 X10*3/UL (ref 1.6–5.5)
NEUTROPHILS NFR BLD AUTO: 66 %
NRBC BLD-RTO: 0 /100 WBCS (ref 0–0)
PLATELET # BLD AUTO: 179 X10*3/UL (ref 150–450)
RBC # BLD AUTO: 4.81 X10*6/UL (ref 4.5–5.9)
WBC # BLD AUTO: 4.6 X10*3/UL (ref 4.4–11.3)

## 2024-05-14 ENCOUNTER — HOSPITAL ENCOUNTER (OUTPATIENT)
Dept: RADIOLOGY | Facility: CLINIC | Age: 84
Discharge: HOME | End: 2024-05-14
Payer: MEDICARE

## 2024-05-14 DIAGNOSIS — R41.3 MEMORY IMPAIRMENT: ICD-10-CM

## 2024-05-14 PROCEDURE — 70450 CT HEAD/BRAIN W/O DYE: CPT

## 2024-05-14 PROCEDURE — 70450 CT HEAD/BRAIN W/O DYE: CPT | Performed by: RADIOLOGY

## 2024-06-25 ENCOUNTER — APPOINTMENT (OUTPATIENT)
Dept: PRIMARY CARE | Facility: CLINIC | Age: 84
End: 2024-06-25
Payer: MEDICARE

## 2024-09-03 ENCOUNTER — LAB (OUTPATIENT)
Dept: LAB | Facility: LAB | Age: 84
End: 2024-09-03
Payer: MEDICARE

## 2024-09-03 ENCOUNTER — APPOINTMENT (OUTPATIENT)
Dept: PRIMARY CARE | Facility: CLINIC | Age: 84
End: 2024-09-03
Payer: MEDICARE

## 2024-09-03 VITALS
BODY MASS INDEX: 23.74 KG/M2 | RESPIRATION RATE: 14 BRPM | HEART RATE: 87 BPM | SYSTOLIC BLOOD PRESSURE: 142 MMHG | TEMPERATURE: 99 F | WEIGHT: 185 LBS | HEIGHT: 74 IN | OXYGEN SATURATION: 95 % | DIASTOLIC BLOOD PRESSURE: 64 MMHG

## 2024-09-03 DIAGNOSIS — C61 MALIGNANT NEOPLASM OF PROSTATE (MULTI): ICD-10-CM

## 2024-09-03 DIAGNOSIS — R10.9 ABDOMINAL PAIN, UNSPECIFIED ABDOMINAL LOCATION: ICD-10-CM

## 2024-09-03 DIAGNOSIS — G45.9 TRANSIENT CEREBRAL ISCHEMIA, UNSPECIFIED TYPE: ICD-10-CM

## 2024-09-03 DIAGNOSIS — I77.811 ABDOMINAL AORTIC ECTASIA (CMS-HCC): ICD-10-CM

## 2024-09-03 DIAGNOSIS — R45.4 IRRITABILITY: ICD-10-CM

## 2024-09-03 DIAGNOSIS — R41.3 MEMORY IMPAIRMENT: ICD-10-CM

## 2024-09-03 DIAGNOSIS — E78.5 HYPERLIPIDEMIA, UNSPECIFIED HYPERLIPIDEMIA TYPE: ICD-10-CM

## 2024-09-03 DIAGNOSIS — Z13.89 ENCOUNTER FOR SCREENING FOR OTHER DISORDER: ICD-10-CM

## 2024-09-03 DIAGNOSIS — Z86.73 CHRONIC RIGHT ARTERIAL ISCHEMIC STROKE, MCA (MIDDLE CEREBRAL ARTERY): ICD-10-CM

## 2024-09-03 DIAGNOSIS — Z12.5 SCREENING FOR PROSTATE CANCER: ICD-10-CM

## 2024-09-03 DIAGNOSIS — Z00.00 MEDICARE ANNUAL WELLNESS VISIT, SUBSEQUENT: Primary | ICD-10-CM

## 2024-09-03 DIAGNOSIS — R03.0 ELEVATED BP WITHOUT DIAGNOSIS OF HYPERTENSION: ICD-10-CM

## 2024-09-03 DIAGNOSIS — L82.1 SEBORRHEIC KERATOSIS: ICD-10-CM

## 2024-09-03 PROBLEM — D72.819 LEUKOPENIA: Status: RESOLVED | Noted: 2024-04-22 | Resolved: 2024-09-03

## 2024-09-03 PROBLEM — H69.93 DYSFUNCTION OF BOTH EUSTACHIAN TUBES: Status: RESOLVED | Noted: 2023-05-09 | Resolved: 2024-09-03

## 2024-09-03 PROBLEM — N20.1 RIGHT URETERAL CALCULUS: Status: RESOLVED | Noted: 2023-01-10 | Resolved: 2024-09-03

## 2024-09-03 PROBLEM — I63.131 CEREBROVASCULAR ACCIDENT (CVA) DUE TO EMBOLISM OF RIGHT CAROTID ARTERY (MULTI): Status: RESOLVED | Noted: 2018-05-20 | Resolved: 2024-09-03

## 2024-09-03 PROCEDURE — 36415 COLL VENOUS BLD VENIPUNCTURE: CPT

## 2024-09-03 PROCEDURE — 1160F RVW MEDS BY RX/DR IN RCRD: CPT | Performed by: FAMILY MEDICINE

## 2024-09-03 PROCEDURE — 80048 BASIC METABOLIC PNL TOTAL CA: CPT

## 2024-09-03 PROCEDURE — 1170F FXNL STATUS ASSESSED: CPT | Performed by: FAMILY MEDICINE

## 2024-09-03 PROCEDURE — 1123F ACP DISCUSS/DSCN MKR DOCD: CPT | Performed by: FAMILY MEDICINE

## 2024-09-03 PROCEDURE — 1036F TOBACCO NON-USER: CPT | Performed by: FAMILY MEDICINE

## 2024-09-03 PROCEDURE — 85027 COMPLETE CBC AUTOMATED: CPT

## 2024-09-03 PROCEDURE — 83690 ASSAY OF LIPASE: CPT

## 2024-09-03 PROCEDURE — 1159F MED LIST DOCD IN RCRD: CPT | Performed by: FAMILY MEDICINE

## 2024-09-03 PROCEDURE — 80061 LIPID PANEL: CPT

## 2024-09-03 PROCEDURE — G0103 PSA SCREENING: HCPCS

## 2024-09-03 PROCEDURE — G0439 PPPS, SUBSEQ VISIT: HCPCS | Performed by: FAMILY MEDICINE

## 2024-09-03 ASSESSMENT — ANXIETY QUESTIONNAIRES
3. WORRYING TOO MUCH ABOUT DIFFERENT THINGS: MORE THAN HALF THE DAYS
7. FEELING AFRAID AS IF SOMETHING AWFUL MIGHT HAPPEN: SEVERAL DAYS
GAD7 TOTAL SCORE: 7
1. FEELING NERVOUS, ANXIOUS, OR ON EDGE: SEVERAL DAYS
6. BECOMING EASILY ANNOYED OR IRRITABLE: SEVERAL DAYS
4. TROUBLE RELAXING: NOT AT ALL
2. NOT BEING ABLE TO STOP OR CONTROL WORRYING: SEVERAL DAYS
IF YOU CHECKED OFF ANY PROBLEMS ON THIS QUESTIONNAIRE, HOW DIFFICULT HAVE THESE PROBLEMS MADE IT FOR YOU TO DO YOUR WORK, TAKE CARE OF THINGS AT HOME, OR GET ALONG WITH OTHER PEOPLE: SOMEWHAT DIFFICULT
5. BEING SO RESTLESS THAT IT IS HARD TO SIT STILL: SEVERAL DAYS

## 2024-09-03 ASSESSMENT — ACTIVITIES OF DAILY LIVING (ADL)
DOING_HOUSEWORK: INDEPENDENT
GROCERY_SHOPPING: INDEPENDENT
DRESSING: INDEPENDENT
MANAGING_FINANCES: INDEPENDENT
TAKING_MEDICATION: INDEPENDENT
BATHING: INDEPENDENT

## 2024-09-03 ASSESSMENT — PATIENT HEALTH QUESTIONNAIRE - PHQ9
4. FEELING TIRED OR HAVING LITTLE ENERGY: NOT AT ALL
SUM OF ALL RESPONSES TO PHQ QUESTIONS 1-9: 2
9. THOUGHTS THAT YOU WOULD BE BETTER OFF DEAD, OR OF HURTING YOURSELF: NOT AT ALL
SUM OF ALL RESPONSES TO PHQ9 QUESTIONS 1 AND 2: 1
5. POOR APPETITE OR OVEREATING: NOT AT ALL
10. IF YOU CHECKED OFF ANY PROBLEMS, HOW DIFFICULT HAVE THESE PROBLEMS MADE IT FOR YOU TO DO YOUR WORK, TAKE CARE OF THINGS AT HOME, OR GET ALONG WITH OTHER PEOPLE: NOT DIFFICULT AT ALL
1. LITTLE INTEREST OR PLEASURE IN DOING THINGS: SEVERAL DAYS
3. TROUBLE FALLING OR STAYING ASLEEP OR SLEEPING TOO MUCH: NOT AT ALL
8. MOVING OR SPEAKING SO SLOWLY THAT OTHER PEOPLE COULD HAVE NOTICED. OR THE OPPOSITE, BEING SO FIGETY OR RESTLESS THAT YOU HAVE BEEN MOVING AROUND A LOT MORE THAN USUAL: NOT AT ALL
2. FEELING DOWN, DEPRESSED OR HOPELESS: NOT AT ALL
6. FEELING BAD ABOUT YOURSELF - OR THAT YOU ARE A FAILURE OR HAVE LET YOURSELF OR YOUR FAMILY DOWN: SEVERAL DAYS
7. TROUBLE CONCENTRATING ON THINGS, SUCH AS READING THE NEWSPAPER OR WATCHING TELEVISION: NOT AT ALL

## 2024-09-03 ASSESSMENT — LIFESTYLE VARIABLES: HOW OFTEN DO YOU HAVE A DRINK CONTAINING ALCOHOL: 4 OR MORE TIMES A WEEK

## 2024-09-03 NOTE — PATIENT INSTRUCTIONS
Malignant neoplasm of prostate (Multi)  H/o  Diagnosed around 3316-5521, s/p resection.  6/2022 PSA 0.02    Medicare annual wellness visit, subsequent  Vaccines and screenings reviewed.  Questionnaires completed.  Health and wellness topics reviewed.  Diet and exercise recommendations revisited.  Routine blood work ordered today.     VACCINES:  - Patient declines all vaccines at this time  -TDAP is due, recommended every 10 years, patient defers  -Shingrix recommended, patient declines  -Pneumonia vaccine recommended, patient declines.    SCREENINGS:  -patient has graduated from routine prostate and colon cancer screenings and continues to defer these.    LIFESTYLE MEASURES  -consider increasing protein intake provided no issues with kidneys to 1 gram per 1 pound of ideal body weight per not to exceed 150 gram per day. May have to supplement with a protein powder to achieve this goal.  -make sure you are avoiding refined carbs such as breads, pasta, cereal, candy, soda,  nutrition bars, granola, chips, and sugar sweetened beverages.      -eat 5- 7 servings daily of veggies,  healthy protein such as chicken, fish,  beans, and eggs, and include healthy fats in your diet such as seeds, nuts, olive oil, avocados, and salmon.   -exercise 4 - 6 days per week as you are able, 150 minutes total weekly divided up is recommended with 3-4 of those days including resistance/strength training.  -Vitamin D is recommended at 1000 - 5000 IU international units daily.   -Always wear sunscreen when you have sun exposure.  -64 oz of water is recommended daily.  -Dental visits recommended every 6 months.  -Eye exam recommended every 2 years, for those with vision problems every year.      Seborrheic keratosis  Numerous throughout his body, reassured that these are benign, no further evaluation necessary.    Memory impairment  11/2023 B-12 and TSH wnl  04/2024 CBC wnl    05/2024 CT Head: Moderate diffuse parenchymal volume loss without  specific lobar pattern to suggest an underlying neurodegenerative process. Mild microangiopathic changes. No acute intracranial abnormality.    05/2024 patient deferred neurology referral    09/3/2024 SLUMS: 26 out of 30    Slums today reviewed and discussed with patient, this is reassuring.  No further evaluation indicated at this time.    Abdominal aortic ectasia (CMS-HCC)  12/2022 CT A/P: Diffuse atherosclerotic disease. The principal vasculature of the abdomen and pelvis is patent. Infrarenal aortic ectasia measures up to 2.7 cm. No aneurysm. Small area of either focal dissection or plaque ulceration noted in the anterior mid aorta, infrarenal region.    Mild ectasia, no aneurysm, patient asymptomatic, no further imaging indicated at this time.    Chronic right arterial ischemic stroke, MCA (middle cerebral artery)  Follows regularly (every 6 months) with neurology at [facility].  Follows regularly with vascular for imaging of right ICA occlusion which has been historically stable.  Some residual deficit in peripheral vision but this remains stable.  Continues with Plavix  Revisited importance of good BP control.  Diet and exercise recommendations revisited.     Hyperlipidemia  11/2023 TC/HDL ratio 2.9, , trig 53  Goal TC/HDL ratio 3.4 or less, LDL 99 or less,  or less, and TRIG 150 or less.     Lifestyle managed  Not on statin but takes red yeast rice.  Diet and exercise recommendations revisited.     To lower this with lifestyle measures:  -make sure you are avoiding refined carbs such as breads, pasta, cereal, candy, soda,  nutrition bars, granola, chips, and sugar sweetened beverages.      -eat 5- 7 servings daily of veggies,  healthy protein such as chicken, fish,  beans, and eggs, and include healthy fats in your diet such as seeds, nuts, olive oil, avocados, and salmon.   -exercise 4 - 6 days per week as you are able, 150 minutes total weekly divided up is recommended with 3-4 of those  days including resistance/strength training.  -consider taking the fiber product psyllium capsules or powder 2 times daily (generic brand is fine) , or a brand name psyllium such as [facility] [facility] or Metamucil.  -consider also adding plant stanols and sterols such as Nature Made CholestOff, available over the counter.     Transient cerebral ischemia  Occurred May 2018, continue Plavix anticoagulation.  Last CBC 4/2024  Follows regularly with neurology who manages Plavix rx.    Elevated BP without diagnosis of hypertension  BP is 143/73 in office today, goal BP is 130/80 or less, ideally 120/80 or less.   Reports normotensive BP outside the office, I have asked that he continue to monitor.    Patient to check BP regularly at home and keep a diary.  This should be taken after sitting with feet flat on floor and resting for 5 minutes.   Arm should be level with your heart.   New guidelines recommend goal for blood pressure less than 130/80.   Ideally for stroke and heart attack risk reduction the systolic, or top, blood pressure number should be in the 110's or 120's.    PLEASE BRING BP CUFF IN TO NEXT VISIT FOR VALIDATION - TAKE YOUR BP @ HOME BEFORE YOU COME!     Irritability  Previously prescribed the Zoloft but never took this due to possible side effects, he has since starting OTC Brillia but does not think this is very effective for his irritability. Patient disinclined to pursue medications at this time. I have strongly encouraged counseling, see resources below. Can reach out if wishes to revisit mood/irritability before next routine follow-up.    Counseling services can also be extremely helpful.    Call your insurance to see what agencies are covered.  See list of resources provided below for some options available around here.  Then get on sites of those covered, review bios of those accepting new patients, pick one you think you may fit well with.  Don't be discouraged if you have to go through multiple  counselors before you find the best fit for you.    [employer]     [facility]  [address] [place] [address]  [phone]    [facility]  [email or web address]  [address]  [place] [address]  Phone [phone]    [facility]  <[email or web address]  [address] [place] [address]   Phone [phone]    [facility]  [address] [address]  [phone]    [facility]  [address] [address]  [phone]     [facility]   [email or web address]   [address] [place] [address]  [phone]    [facility]  [address] [address]  [phone]    [facility]  <[email or web address]  [address] [facility] [address]   Phone [phone]    [facility]  <[email or web address]  [address] [place] [address]  [phone]    [facility]  <[email or web address]   [address], [place] [address]  Phone: [phone]     Lift weights/ therabands 3 - 4 days per week   Go to counseling, pretty please - see above   Drink plenty of water (64 oz daily) to decrease kidney stone recurrence risk    Follow up in 1 yr

## 2024-09-03 NOTE — ASSESSMENT & PLAN NOTE
Follows regularly (every 6 months) with neurology at [facility].  Follows regularly with vascular for imaging of right ICA occlusion which has been historically stable.  Some residual deficit in peripheral vision but this remains stable.  Continues with Plavix  Revisited importance of good BP control.  Diet and exercise recommendations revisited.

## 2024-09-03 NOTE — ASSESSMENT & PLAN NOTE
Vaccines and screenings reviewed.  Questionnaires completed.  Health and wellness topics reviewed.  Diet and exercise recommendations revisited.  Routine blood work ordered today.     VACCINES:  - Patient declines all vaccines at this time  -TDAP is due, recommended every 10 years, patient defers  -Shingrix recommended, patient declines  -Pneumonia vaccine recommended, patient declines.    SCREENINGS:  -patient has graduated from routine prostate and colon cancer screenings and continues to defer these.    LIFESTYLE MEASURES  -consider increasing protein intake provided no issues with kidneys to 1 gram per 1 pound of ideal body weight per not to exceed 150 gram per day. May have to supplement with a protein powder to achieve this goal.  -make sure you are avoiding refined carbs such as breads, pasta, cereal, candy, soda,  nutrition bars, granola, chips, and sugar sweetened beverages.      -eat 5- 7 servings daily of veggies,  healthy protein such as chicken, fish,  beans, and eggs, and include healthy fats in your diet such as seeds, nuts, olive oil, avocados, and salmon.   -exercise 4 - 6 days per week as you are able, 150 minutes total weekly divided up is recommended with 3-4 of those days including resistance/strength training.  -Vitamin D is recommended at 1000 - 5000 IU international units daily.   -Always wear sunscreen when you have sun exposure.  -64 oz of water is recommended daily.  -Dental visits recommended every 6 months.  -Eye exam recommended every 2 years, for those with vision problems every year.

## 2024-09-03 NOTE — ASSESSMENT & PLAN NOTE
Previously prescribed the Zoloft but never took this due to possible side effects, he has since starting OTC Brillia but does not think this is very effective for his irritability. Patient disinclined to pursue medications at this time. I have strongly encouraged counseling, see resources below. Can reach out if wishes to revisit mood/irritability before next routine follow-up.    Counseling services can also be extremely helpful.    Call your insurance to see what agencies are covered.  See list of resources provided below for some options available around here.  Then get on sites of those covered, review bios of those accepting new patients, pick one you think you may fit well with.  Don't be discouraged if you have to go through multiple counselors before you find the best fit for you.    [employer]     [facility]  [address] [place] [address]  [phone]    [facility]  [email or web address]  [address]  [place] [address]  Phone [phone]    [facility]  <[email or web address]  [address] [place] [address]   Phone [phone]    [facility]  [address] [address]  [phone]    [facility]  [address] [address]  [phone]     [facility]   [email or web address]   [address] [place] [address]  [phone]    [facility]  [address], [place] [address]  [phone]    [facility]  <[email or web address]  [address] [facility] [address]   Phone [phone]    [facility]  <[email or web address]  [address] [place] [address]  [phone]    [facility]  <[email or web address]   [address] [facility] [address] [address]   Phone: [phone]

## 2024-09-03 NOTE — PROGRESS NOTES
Subjective   Reason for Visit: Tiburcio Bliss is an 84 y.o. male here for a Medicare Wellness visit.     Past Medical, Surgical, and Family History reviewed and updated in chart.    Reviewed all medications by prescribing practitioner or clinical pharmacist (such as prescriptions, OTCs, herbal therapies and supplements) and documented in the medical record.    HPI    Patient presents today for routine FUV + MWV  ABN was given to pt and signed, pt verbalized understanding.     Patient concerns:  No new concerns or issues, they are doing well overall.      MEDICARE WELLNESS VISIT   TDAP: none found  SHINGRIX: none found  PNEUMOVAX: none found - DUE?  CSCOPE: none found - graduated?  PSA: none found - graduated?  AAA SCREEN: n/a  HEP C SCREEN: none found  CACS: none found    Patient declines all vaccines.    Diet: overall balanced  Exercise: golfing, able to lift and move and do things around the home as needed. Shovels snow in winter    Alcohol use: 7 shots of liquor per week?  Smoking: former cigarette smoker    Dental: utd  Vision: utd  Hearing: no complaints reported  Foot care: no complaints reported    Prostate cancer screening:  Personal hx of prostate cancer, see below.    Colon cancer screening: graduated  Denies family history in first degree relative.  Denies melena, hematochezia, constipation, diarrhea, bloating, change in bowel habits.  Endorses occasional abdominal pain, inquiring about possible test for pancreatic cancer  Reports late wife passed from pancreatic cancer  Sister has some lab done for pancreas yearly, wondering if he needs this.    ROUTINE VISIT  CHRONIC CONDITIONS:   Memory concerns  11/2023 B-12 and TSH wnl  04/2024 CBC wnl    05/2024 CT Head: Moderate diffuse parenchymal volume loss without specific lobar pattern to suggest an underlying neurodegenerative process. Mild microangiopathic changes. No acute intracranial abnormality.    05/2024 patient deferred neurology referral    9/3/2024  SLUMS: 26 out of 30    Irritability  Started on Sertraline 50 mg daily 4/2024  Counseling services recommended 4/2024, previously psychology referral placed 11/2023 as well.    PHQ-9: 2 - Q9 was 0  XAVI-7: 7    Patient states today he is not taking the Zoloft, he is taking OTC supplement called Brilia   He states that he read the side effects and could not try the Zoloft in good conscious  Does not feel the Brillia is helpful for his irritability  Reports lots of frustrations due to marital frustration/contentious  Thinks counseling may be helpful but not interested in pursuing  Most sensible and likely helpful solution would be to have separate living spaces but not feasible    HLD  Lifestyle managed  Not on statin but takes red yeast rice.     11/2023 TC/HDL ratio 2.9, , trig 53    Patient denies any facial droop, sudden vision loss, weakness or numbness on one side of body.   Patient denies chest pain, shortness of breath with exertion, palpitations, or symptoms of claudication.     Abdominal aortic ectasia    12/2022 CT A/P: Diffuse atherosclerotic disease. The principal vasculature of the abdomen and pelvis is patent. Infrarenal aortic ectasia measures up to 2.7 cm. No aneurysm. Small area of either focal dissection or plaque ulceration noted in the anterior mid aorta, infrarenal region.    Hx of embolic stroke involving right carotid artery  On plavix anticoagulation.  Some residual deficit in peripheral vision but this remains stable.    Follows with neurology at [facility]  Follows regularly with vascular for imaging of right ICA occlusion which has been historically stable.     5/10/2018: Right MCA stroke, right ICA occlusion, Left carotid stenosis    5/11/2018 MRI of Brain: acute infarcts basal ganglia, subinsular white matter, and temporal lobe white matter. Slow flow or occlusion of right ICA.     Per neurology adequate bp control = sbp 120-140' avoid hypotension due to risk of watershed with right ica  "occlusion.    Hx of transient cerebral ischemia  Occurred May 2018  On Plavix anticoagulation    States continues to see neurology regularly  Neuro manages his rx for Plavix    Hx of kidney stones  5/2023 stone analysis showed calcium oxalate    Hx of prostate cancer  Diagnosed ~12 years ago, s/p resection.  6/2022 PSA 0.02    No urinary symptoms reported.    Patient Care Team:  Akilah Caraballo DO as PCP - General (Family Medicine)  Akilah Caraballo DO as PCP - [employer] PCP     Review of Systems   All other systems reviewed and are negative.      Objective   Vitals:  /73 (BP Location: Right arm, Patient Position: Sitting, BP Cuff Size: Adult)   Pulse 87   Temp 37.2 °C (99 °F) (Temporal)   Resp 14   Ht 1.88 m (6' 2\")   Wt 83.9 kg (185 lb)   SpO2 95%   BMI 23.75 kg/m²       Physical Exam  Constitutional:       General: He is not in acute distress.     Appearance: Normal appearance.   Cardiovascular:      Rate and Rhythm: Normal rate and regular rhythm.      Heart sounds: Normal heart sounds. No murmur heard.     No friction rub. No gallop.      Comments: Prominent varicosities bilateral lower extremities.  Pulmonary:      Effort: Pulmonary effort is normal.      Breath sounds: Normal breath sounds. No wheezing, rhonchi or rales.   Abdominal:      Palpations: Abdomen is soft.      Tenderness: There is no abdominal tenderness. There is no guarding or rebound.   Musculoskeletal:      Right lower leg: No edema.      Left lower leg: No edema.   Neurological:      General: No focal deficit present.      Mental Status: He is alert and oriented to person, place, and time. Mental status is at baseline.      Comments: Struggles with recall of names occasionally but able to identify correctly if prompted with name.   Psychiatric:         Mood and Affect: Mood normal.         Behavior: Behavior normal.         Assessment/Plan   Problem List Items Addressed This Visit             ICD-10-CM "    Hyperlipidemia E78.5     11/2023 TC/HDL ratio 2.9, , trig 53  Goal TC/HDL ratio 3.4 or less, LDL 99 or less,  or less, and TRIG 150 or less.     Lifestyle managed  Not on statin but takes red yeast rice.  Diet and exercise recommendations revisited.     To lower this with lifestyle measures:  -make sure you are avoiding refined carbs such as breads, pasta, cereal, candy, soda,  nutrition bars, granola, chips, and sugar sweetened beverages.      -eat 5- 7 servings daily of veggies,  healthy protein such as chicken, fish,  beans, and eggs, and include healthy fats in your diet such as seeds, nuts, olive oil, avocados, and salmon.   -exercise 4 - 6 days per week as you are able, 150 minutes total weekly divided up is recommended with 3-4 of those days including resistance/strength training.  -consider taking the fiber product psyllium capsules or powder 2 times daily (generic brand is fine) , or a brand name psyllium such as [facility] [place] [facility] or Metamucil.  -consider also adding plant stanols and sterols such as Nature Made CholestOff, available over the counter.          Transient cerebral ischemia G45.9     Occurred May 2018, continue Plavix anticoagulation.  Last CBC 4/2024  Follows regularly with neurology who manages Plavix rx.         Chronic right arterial ischemic stroke, MCA (middle cerebral artery) Z86.73     Follows regularly (every 6 months) with neurology at [facility].  Follows regularly with vascular for imaging of right ICA occlusion which has been historically stable.  Some residual deficit in peripheral vision but this remains stable.  Continues with Plavix  Revisited importance of good BP control.  Diet and exercise recommendations revisited.          Seborrheic keratosis L82.1     Numerous throughout his body, reassured that these are benign, no further evaluation necessary.         Irritability R45.4     Previously prescribed the Zoloft but never took this due to possible side  effects, he has since starting OTC Brillia but does not think this is very effective for his irritability. Patient disinclined to pursue medications at this time. I have strongly encouraged counseling, see resources below. Can reach out if wishes to revisit mood/irritability before next routine follow-up.    Counseling services can also be extremely helpful.    Call your insurance to see what agencies are covered.  See list of resources provided below for some options available around here.  Then get on sites of those covered, review bios of those accepting new patients, pick one you think you may fit well with.  Don't be discouraged if you have to go through multiple counselors before you find the best fit for you.    [employer]     [facility]  [address] [place] [address]  [phone]    [facility]  [email or web address]  [address]  [place] [address]  Phone [phone]    [facility]  <[email or web address]  [address] [place] [address]   Phone [phone]    [facility]  [address] [address]  [phone]    [facility]  [address] [address]  [phone]     [facility]   [email or web address]   [address] [place] [address]  [phone]    [facility]  [address] [place] [address] [address] [address]  [phone]    [facility]  <[email or web address]  [address]   Phone [phone]    [facility]  <[email or web address]  [address] [place] [address]  [phone]    [facility]  <[email or web address]   [address] [place] [address]  Phone: [phone]          Memory impairment R41.3     11/2023 B-12  and TSH wnl  04/2024 CBC wnl    05/2024 CT Head: Moderate diffuse parenchymal volume loss without specific lobar pattern to suggest an underlying neurodegenerative process. Mild microangiopathic changes. No acute intracranial abnormality.    05/2024 patient deferred neurology referral    09/3/2024 SLUMS: 26 out of 30    Slums today reviewed and discussed with patient, this is reassuring.  No further evaluation indicated at this time.         Medicare annual wellness visit, subsequent - Primary Z00.00     Vaccines and screenings reviewed.  Questionnaires completed.  Health and wellness topics reviewed.  Diet and exercise recommendations revisited.  Routine blood work ordered today.     VACCINES:  - Patient declines all vaccines at this time  -TDAP is due, recommended every 10 years, patient defers  -Shingrix recommended, patient declines  -Pneumonia vaccine recommended, patient declines.    SCREENINGS:  -patient has graduated from routine prostate and colon cancer screenings and continues to defer these.    LIFESTYLE MEASURES  -consider increasing protein intake provided no issues with kidneys to 1 gram per 1 pound of ideal body weight per not to exceed 150 gram per day. May have to supplement with a protein powder to achieve this goal.  -make sure you are avoiding refined carbs such as breads, pasta, cereal, candy, soda,  nutrition bars, granola, chips, and sugar sweetened beverages.      -eat 5- 7 servings daily of veggies,  healthy protein such as chicken, fish,  beans, and eggs, and include healthy fats in your diet such as seeds, nuts, olive oil, avocados, and salmon.   -exercise 4 - 6 days per week as you are able, 150 minutes total weekly divided up is recommended with 3-4 of those days including resistance/strength training.  -Vitamin D is recommended at 1000 - 5000 IU international units daily.   -Always wear sunscreen when you have sun exposure.  -64 oz of water is recommended daily.  -Dental visits  recommended every 6 months.  -Eye exam recommended every 2 years, for those with vision problems every year.           RESOLVED: Malignant neoplasm of prostate (Multi) C61     H/o  Diagnosed around 7349-5905, s/p resection.  6/2022 PSA 0.02         Abdominal aortic ectasia (CMS-HCC) I77.811     12/2022 CT A/P: Diffuse atherosclerotic disease. The principal vasculature of the abdomen and pelvis is patent. Infrarenal aortic ectasia measures up to 2.7 cm. No aneurysm. Small area of either focal dissection or plaque ulceration noted in the anterior mid aorta, infrarenal region.    Mild ectasia, no aneurysm, patient asymptomatic, no further imaging indicated at this time.         Elevated BP without diagnosis of hypertension R03.0     BP is 143/73 in office today, goal BP is 130/80 or less, ideally 120/80 or less.   Reports normotensive BP outside the office, I have asked that he continue to monitor.    Patient to check BP regularly at home and keep a diary.  This should be taken after sitting with feet flat on floor and resting for 5 minutes.   Arm should be level with your heart.   New guidelines recommend goal for blood pressure less than 130/80.   Ideally for stroke and heart attack risk reduction the systolic, or top, blood pressure number should be in the 110's or 120's.    PLEASE BRING BP CUFF IN TO NEXT VISIT FOR VALIDATION - TAKE YOUR BP @ HOME BEFORE YOU COME!           Other Visit Diagnoses         Codes    Encounter for screening for other disorder     Z13.89    Body mass index (BMI) of 23.0 to 23.9 in adult     Z68.23    Abdominal pain, unspecified abdominal location     R10.9    Screening for prostate cancer     Z12.5            Follow-up in 1 year for routine care + MWV.  Scales upon rooming.   Call for sooner follow-up if needed.       Scribe Attestation  By signing my name below, I, Cherry Simons   attest that this documentation has been prepared under the direction and in the presence of Akilah  NIGEL Caraballo DO.

## 2024-09-03 NOTE — ASSESSMENT & PLAN NOTE
12/2022 CT A/P: Diffuse atherosclerotic disease. The principal vasculature of the abdomen and pelvis is patent. Infrarenal aortic ectasia measures up to 2.7 cm. No aneurysm. Small area of either focal dissection or plaque ulceration noted in the anterior mid aorta, infrarenal region.    Mild ectasia, no aneurysm, patient asymptomatic, no further imaging indicated at this time.

## 2024-09-03 NOTE — ASSESSMENT & PLAN NOTE
11/2023 B-12 and TSH wnl  04/2024 CBC wnl    05/2024 CT Head: Moderate diffuse parenchymal volume loss without specific lobar pattern to suggest an underlying neurodegenerative process. Mild microangiopathic changes. No acute intracranial abnormality.    05/2024 patient deferred neurology referral    09/3/2024 SLUMS: 26 out of 30    Slums today reviewed and discussed with patient, this is reassuring.  No further evaluation indicated at this time.

## 2024-09-03 NOTE — ASSESSMENT & PLAN NOTE
BP is 143/73 in office today, goal BP is 130/80 or less, ideally 120/80 or less.   Reports normotensive BP outside the office, I have asked that he continue to monitor.    Patient to check BP regularly at home and keep a diary.  This should be taken after sitting with feet flat on floor and resting for 5 minutes.   Arm should be level with your heart.   New guidelines recommend goal for blood pressure less than 130/80.   Ideally for stroke and heart attack risk reduction the systolic, or top, blood pressure number should be in the 110's or 120's.    PLEASE BRING BP CUFF IN TO NEXT VISIT FOR VALIDATION - TAKE YOUR BP @ HOME BEFORE YOU COME!

## 2024-09-03 NOTE — ASSESSMENT & PLAN NOTE
Occurred May 2018, continue Plavix anticoagulation.  Last CBC 4/2024  Follows regularly with neurology who manages Plavix rx.

## 2024-09-04 LAB
ANION GAP SERPL CALC-SCNC: 13 MMOL/L (ref 10–20)
BUN SERPL-MCNC: 15 MG/DL (ref 6–23)
CALCIUM SERPL-MCNC: 10.2 MG/DL (ref 8.6–10.6)
CHLORIDE SERPL-SCNC: 106 MMOL/L (ref 98–107)
CHOLEST SERPL-MCNC: 247 MG/DL (ref 0–199)
CHOLESTEROL/HDL RATIO: 2.8
CO2 SERPL-SCNC: 28 MMOL/L (ref 21–32)
CREAT SERPL-MCNC: 1.1 MG/DL (ref 0.5–1.3)
EGFRCR SERPLBLD CKD-EPI 2021: 67 ML/MIN/1.73M*2
ERYTHROCYTE [DISTWIDTH] IN BLOOD BY AUTOMATED COUNT: 12.8 % (ref 11.5–14.5)
GLUCOSE SERPL-MCNC: 91 MG/DL (ref 74–99)
HCT VFR BLD AUTO: 49 % (ref 41–52)
HDLC SERPL-MCNC: 89 MG/DL
HGB BLD-MCNC: 15.8 G/DL (ref 13.5–17.5)
LDLC SERPL CALC-MCNC: 148 MG/DL
LIPASE SERPL-CCNC: 12 U/L (ref 9–82)
MCH RBC QN AUTO: 32 PG (ref 26–34)
MCHC RBC AUTO-ENTMCNC: 32.2 G/DL (ref 32–36)
MCV RBC AUTO: 99 FL (ref 80–100)
NON HDL CHOLESTEROL: 158 MG/DL (ref 0–149)
NRBC BLD-RTO: 0 /100 WBCS (ref 0–0)
PLATELET # BLD AUTO: 192 X10*3/UL (ref 150–450)
POTASSIUM SERPL-SCNC: 4.4 MMOL/L (ref 3.5–5.3)
PSA SERPL-MCNC: <0.1 NG/ML
RBC # BLD AUTO: 4.94 X10*6/UL (ref 4.5–5.9)
SODIUM SERPL-SCNC: 143 MMOL/L (ref 136–145)
TRIGL SERPL-MCNC: 50 MG/DL (ref 0–149)
VLDL: 10 MG/DL (ref 0–40)
WBC # BLD AUTO: 5.9 X10*3/UL (ref 4.4–11.3)

## 2025-03-03 ENCOUNTER — APPOINTMENT (OUTPATIENT)
Dept: PRIMARY CARE | Facility: CLINIC | Age: 85
End: 2025-03-03
Payer: MEDICARE

## 2025-03-03 VITALS
WEIGHT: 186 LBS | HEART RATE: 73 BPM | TEMPERATURE: 97.8 F | HEIGHT: 74 IN | SYSTOLIC BLOOD PRESSURE: 134 MMHG | OXYGEN SATURATION: 97 % | BODY MASS INDEX: 23.87 KG/M2 | DIASTOLIC BLOOD PRESSURE: 73 MMHG

## 2025-03-03 DIAGNOSIS — Z86.73 CHRONIC RIGHT ARTERIAL ISCHEMIC STROKE, MCA (MIDDLE CEREBRAL ARTERY): ICD-10-CM

## 2025-03-03 DIAGNOSIS — I65.21 RIGHT CAROTID ARTERY OCCLUSION: ICD-10-CM

## 2025-03-03 DIAGNOSIS — R41.3 MEMORY IMPAIRMENT: ICD-10-CM

## 2025-03-03 DIAGNOSIS — K21.9 GASTROESOPHAGEAL REFLUX DISEASE, UNSPECIFIED WHETHER ESOPHAGITIS PRESENT: ICD-10-CM

## 2025-03-03 DIAGNOSIS — I65.22 LEFT CAROTID ARTERY STENOSIS: ICD-10-CM

## 2025-03-03 DIAGNOSIS — Z90.79 HISTORY OF PROSTATECTOMY: ICD-10-CM

## 2025-03-03 DIAGNOSIS — R03.0 ELEVATED BP WITHOUT DIAGNOSIS OF HYPERTENSION: ICD-10-CM

## 2025-03-03 DIAGNOSIS — E78.5 HYPERLIPIDEMIA, UNSPECIFIED HYPERLIPIDEMIA TYPE: ICD-10-CM

## 2025-03-03 DIAGNOSIS — I77.811 ABDOMINAL AORTIC ECTASIA (CMS-HCC): ICD-10-CM

## 2025-03-03 DIAGNOSIS — M25.511 RIGHT SHOULDER PAIN, UNSPECIFIED CHRONICITY: ICD-10-CM

## 2025-03-03 DIAGNOSIS — G45.9 TRANSIENT CEREBRAL ISCHEMIA, UNSPECIFIED TYPE: ICD-10-CM

## 2025-03-03 DIAGNOSIS — Z85.46 HISTORY OF PROSTATE CANCER: ICD-10-CM

## 2025-03-03 DIAGNOSIS — Z76.89 ENCOUNTER TO ESTABLISH CARE: Primary | ICD-10-CM

## 2025-03-03 PROBLEM — N20.0: Status: RESOLVED | Noted: 2023-01-10 | Resolved: 2025-03-03

## 2025-03-03 PROCEDURE — 1123F ACP DISCUSS/DSCN MKR DOCD: CPT | Performed by: STUDENT IN AN ORGANIZED HEALTH CARE EDUCATION/TRAINING PROGRAM

## 2025-03-03 PROCEDURE — 99214 OFFICE O/P EST MOD 30 MIN: CPT | Performed by: STUDENT IN AN ORGANIZED HEALTH CARE EDUCATION/TRAINING PROGRAM

## 2025-03-03 PROCEDURE — G2211 COMPLEX E/M VISIT ADD ON: HCPCS | Performed by: STUDENT IN AN ORGANIZED HEALTH CARE EDUCATION/TRAINING PROGRAM

## 2025-03-03 PROCEDURE — 1160F RVW MEDS BY RX/DR IN RCRD: CPT | Performed by: STUDENT IN AN ORGANIZED HEALTH CARE EDUCATION/TRAINING PROGRAM

## 2025-03-03 PROCEDURE — 1159F MED LIST DOCD IN RCRD: CPT | Performed by: STUDENT IN AN ORGANIZED HEALTH CARE EDUCATION/TRAINING PROGRAM

## 2025-03-03 RX ORDER — CLOPIDOGREL BISULFATE 75 MG/1
75 TABLET ORAL DAILY
Qty: 90 TABLET | Refills: 0
Start: 2025-03-03 | End: 2025-06-01

## 2025-03-03 ASSESSMENT — ENCOUNTER SYMPTOMS
SHORTNESS OF BREATH: 0
FEVER: 0
CHILLS: 0
FATIGUE: 0

## 2025-03-03 ASSESSMENT — PATIENT HEALTH QUESTIONNAIRE - PHQ9
1. LITTLE INTEREST OR PLEASURE IN DOING THINGS: NOT AT ALL
SUM OF ALL RESPONSES TO PHQ9 QUESTIONS 1 AND 2: 0
2. FEELING DOWN, DEPRESSED OR HOPELESS: NOT AT ALL

## 2025-03-03 NOTE — ASSESSMENT & PLAN NOTE
12/2022 CT A/P: Diffuse atherosclerotic disease. The principal vasculature of the abdomen and pelvis is patent. Infrarenal aortic ectasia measures up to 2.7 cm. No aneurysm. Small area of either focal dissection or plaque ulceration noted in the anterior mid aorta, infrarenal region.

## 2025-03-03 NOTE — PROGRESS NOTES
"Subjective   Patient ID: Tiburcio Bliss is a 84 y.o. male who presents for Establish Care.    HPI     He is here to establish care.  No acute concerns or complaints today.  He did want to have a small cut on his wrist looked at just to make sure was not getting infected but he did not notice any other symptoms.  He does follow regularly with his vascular surgeon.  He does need a refill of the Plavix sent to his pharmacy.  Does believe that he will be due for lab work later in the fall along with his regular wellness care.    Review of Systems   Constitutional:  Negative for chills, fatigue and fever.   HENT:  Negative for congestion.    Respiratory:  Negative for shortness of breath.    Cardiovascular:  Negative for chest pain.       Objective   /73   Pulse 73   Temp 36.6 °C (97.8 °F)   Ht 1.867 m (6' 1.5\")   Wt 84.4 kg (186 lb)   SpO2 97%   BMI 24.21 kg/m²     Physical Exam  Vitals and nursing note reviewed.   Constitutional:       General: He is not in acute distress.     Appearance: Normal appearance. He is normal weight. He is not ill-appearing or toxic-appearing.   HENT:      Head: Normocephalic and atraumatic.   Cardiovascular:      Rate and Rhythm: Normal rate and regular rhythm.      Heart sounds: Normal heart sounds.   Pulmonary:      Effort: Pulmonary effort is normal.      Breath sounds: Normal breath sounds.   Musculoskeletal:         General: Normal range of motion.   Skin:     General: Skin is warm and dry.   Neurological:      Mental Status: He is alert.         Assessment/Plan   Problem List Items Addressed This Visit             ICD-10-CM    Gastroesophageal reflux disease K21.9     Chronic. Stable. Dietary controlled.         Hyperlipidemia E78.5     Lifestyle managed  Not on statin but takes red yeast rice.         Relevant Orders    CBC and Auto Differential    Comprehensive Metabolic Panel    Lipid Panel    TSH with reflex to Free T4 if abnormal    Urinalysis with Reflex Microscopic "    History of prostate cancer Z85.46     Diagnosed around 3467-1602, s/p resection.         Relevant Orders    Prostate Specific Antigen    Urinalysis with Reflex Microscopic    Right shoulder pain M25.511     Still has some pain in the right shoulder from a couple of years ago. Persistent and not worsening. Previously thought to be more of and impingement.         Transient cerebral ischemia G45.9     Occurred May 2018  On Plavix anticoagulation         Relevant Medications    clopidogrel (Plavix) 75 mg tablet    Chronic right arterial ischemic stroke, MCA (middle cerebral artery) Z86.73     On plavix anticoagulation.  Some residual deficit in peripheral vision but this remains stable.         Memory impairment R41.3    Abdominal aortic ectasia (CMS-[facility]) I77.811     12/2022 CT A/P: Diffuse atherosclerotic disease. The principal vasculature of the abdomen and pelvis is patent. Infrarenal aortic ectasia measures up to 2.7 cm. No aneurysm. Small area of either focal dissection or plaque ulceration noted in the anterior mid aorta, infrarenal region.         Elevated BP without diagnosis of hypertension R03.0     Goal BP is 130/80 or less, ideally 120/80 or less.   Reports normotensive BP outside the office, I have asked that he continue to monitor.         Relevant Orders    CBC and Auto Differential    Comprehensive Metabolic Panel    Lipid Panel    Urinalysis with Reflex Microscopic    History of prostatectomy Z90.79     Performed in 2013 at [facility] after diagnosis of prostate cancer.         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 Follows with Dr. Grey and his office.         Relevant Medications    clopidogrel (Plavix) 75 mg tablet    Left carotid artery stenosis I65.22     Following with vascular surgery with [facility]. Follows with Dr. Grey and his office.         Relevant Medications    clopidogrel (Plavix) 75 mg tablet     Other Visit Diagnoses          Codes    Encounter to establish care    -  Primary Z76.89          History and physical examination as above.  Patient coming in to establish care.  Updated patient's medical history within the chart.  Went ahead and sent in a refill of the patient's Plavix to his pharmacy.  He will call if he has any issues picking this up.  We will plan for a Medicare wellness examination later in the fall in September.  Lab work orders placed to be done prior to that timeframe.  He was instructed to get the labs done fasting so that we can go over this at the next appointment.  Did discuss over-the-counter medications that he can take for his postnasal drainage.  Did also discussed that we can have him follow-up with an ENT if this continues.  He will keep an eye on his wrist to make sure he does not have any increasing redness or any other signs of infection around the cut that he recently got on his wrist.  He will continue with regular follow-up with his vascular surgeon.  Blood pressure is fairly well-controlled in the office today and he will continue to keep an eye on this at home.  He will call in sooner if any other acute concerns or complaints and we will continue with regular wellness care.

## 2025-03-03 NOTE — ASSESSMENT & PLAN NOTE
Goal BP is 130/80 or less, ideally 120/80 or less.   Reports normotensive BP outside the office, I have asked that he continue to monitor.

## 2025-03-03 NOTE — PATIENT INSTRUCTIONS
Thank you for coming in and getting established with us today in the office.    I went ahead and updated some of your medical history within the chart.    I also went ahead and sent in a prescription for the Plavix to your pharmacy with marks.  Please let me know if they do not receive this prescription or if you wanted issues getting it filled later on in the future.    I did go ahead and place blood work orders to get done fasting prior to your next appointment with me.  Please get set up for a Medicare wellness examination with me in September.  This appointment can be set up before you leave the office today.  Please be fasting for about 10 hours beforehand.  The lab is located downstairs on the first floor of this building and they are open on weekdays only from about 6:30 AM to about 4:30 PM.  You do not need an appointment to get the lab work done.    Please get the labs done anywhere from 1 to about 3 weeks prior to your next appointment so that we can go over the results at that time.    I do think that you have do have some posterior nasal drainage and sometimes that is causing a little bit of the cough with the mucus.  You can try an over-the-counter Flonase nasal spray or an allergy medication to try to dry the supple little bit.  We can take this at times when it really flares up.  If this continues, we could have you see an ENT.    Please keep an eye over the cut on your wrist especially if you have any increasing redness or anything spreading around the area.    Please continue with regular follow-up with your vascular surgeon for the carotid arteries.    Please continue to keep an eye on blood pressure at home and overall looks pretty well-controlled in the office today.    We can plan on seeing you for a Medicare wellness examination in September.  Please call sooner with any other acute concerns or complaints.    Thank you

## 2025-03-03 NOTE — ASSESSMENT & PLAN NOTE
Still has some pain in the right shoulder from a couple of years ago. Persistent and not worsening. Previously thought to be more of and impingement.

## 2025-05-05 ENCOUNTER — TELEPHONE (OUTPATIENT)
Dept: PRIMARY CARE | Facility: CLINIC | Age: 85
End: 2025-05-05
Payer: MEDICARE

## 2025-05-05 DIAGNOSIS — G45.9 TRANSIENT CEREBRAL ISCHEMIA, UNSPECIFIED TYPE: ICD-10-CM

## 2025-05-05 DIAGNOSIS — I65.21 RIGHT CAROTID ARTERY OCCLUSION: ICD-10-CM

## 2025-05-05 DIAGNOSIS — I65.22 LEFT CAROTID ARTERY STENOSIS: ICD-10-CM

## 2025-05-05 RX ORDER — CLOPIDOGREL BISULFATE 75 MG/1
75 TABLET ORAL DAILY
Qty: 90 TABLET | Refills: 0 | Status: SHIPPED | OUTPATIENT
Start: 2025-05-05 | End: 2025-08-03

## 2025-05-28 ENCOUNTER — HOSPITAL ENCOUNTER (OUTPATIENT)
Dept: RADIOLOGY | Facility: CLINIC | Age: 85
Discharge: HOME | End: 2025-05-28
Payer: MEDICARE

## 2025-05-28 ENCOUNTER — OFFICE VISIT (OUTPATIENT)
Dept: PRIMARY CARE | Facility: CLINIC | Age: 85
End: 2025-05-28
Payer: MEDICARE

## 2025-05-28 VITALS
BODY MASS INDEX: 24.34 KG/M2 | SYSTOLIC BLOOD PRESSURE: 132 MMHG | TEMPERATURE: 97.5 F | HEART RATE: 85 BPM | DIASTOLIC BLOOD PRESSURE: 70 MMHG | WEIGHT: 187 LBS

## 2025-05-28 DIAGNOSIS — R31.9 HEMATURIA, UNSPECIFIED TYPE: ICD-10-CM

## 2025-05-28 DIAGNOSIS — R10.9 RIGHT FLANK PAIN: Primary | ICD-10-CM

## 2025-05-28 DIAGNOSIS — R10.9 RIGHT FLANK PAIN: ICD-10-CM

## 2025-05-28 LAB
POC BILIRUBIN, URINE: ABNORMAL
POC BLOOD, URINE: ABNORMAL
POC GLUCOSE, URINE: NEGATIVE MG/DL
POC KETONES, URINE: ABNORMAL MG/DL
POC LEUKOCYTES, URINE: NEGATIVE
POC NITRITE,URINE: NEGATIVE
POC PH, URINE: 6.5 PH
POC PROTEIN, URINE: ABNORMAL MG/DL
POC SPECIFIC GRAVITY, URINE: 1.01
POC UROBILINOGEN, URINE: 0.2 EU/DL

## 2025-05-28 PROCEDURE — 99214 OFFICE O/P EST MOD 30 MIN: CPT | Performed by: FAMILY MEDICINE

## 2025-05-28 PROCEDURE — 1036F TOBACCO NON-USER: CPT | Performed by: FAMILY MEDICINE

## 2025-05-28 PROCEDURE — 81003 URINALYSIS AUTO W/O SCOPE: CPT | Performed by: FAMILY MEDICINE

## 2025-05-28 PROCEDURE — 1160F RVW MEDS BY RX/DR IN RCRD: CPT | Performed by: FAMILY MEDICINE

## 2025-05-28 PROCEDURE — 74176 CT ABD & PELVIS W/O CONTRAST: CPT

## 2025-05-28 PROCEDURE — 1159F MED LIST DOCD IN RCRD: CPT | Performed by: FAMILY MEDICINE

## 2025-05-28 PROCEDURE — 74176 CT ABD & PELVIS W/O CONTRAST: CPT | Performed by: STUDENT IN AN ORGANIZED HEALTH CARE EDUCATION/TRAINING PROGRAM

## 2025-05-28 ASSESSMENT — ENCOUNTER SYMPTOMS
PALPITATIONS: 0
COUGH: 0
ACTIVITY CHANGE: 0
APPETITE CHANGE: 0
FEVER: 0
WHEEZING: 0
HEMATURIA: 0
ABDOMINAL PAIN: 0
CONFUSION: 0
BACK PAIN: 1
VOMITING: 0
CONSTIPATION: 0
CHILLS: 0
ARTHRALGIAS: 0
DIARRHEA: 0
FACIAL SWELLING: 0
COLOR CHANGE: 0
EYE DISCHARGE: 0

## 2025-05-28 NOTE — PROGRESS NOTES
"Subjective   Patient ID: Tiburcio Bliss is a 85 y.o. male who presents for Possible kidney stones  (Experiencing mid back pain since last evening. ).    HPI   Patient has concerned that he has been having having some mid back pain since last evening.  He does have history of kidney stones and follows with urologist Dr. Costello.   He rates the pain at intense. He got up and sat on edge of bed and it minimizes. He can lay on left side but this is worse if he lays on his right side.     He had some episode like this back in April and has started OTC [place] to help \"reduce the amount of stones.\"     Review of Systems   Constitutional:  Negative for activity change, appetite change, chills and fever.   HENT:  Negative for congestion, ear pain and facial swelling.    Eyes:  Negative for discharge.   Respiratory:  Negative for cough and wheezing.    Cardiovascular:  Negative for chest pain, palpitations and leg swelling.   Gastrointestinal:  Negative for abdominal pain, constipation, diarrhea and vomiting.   Genitourinary:  Negative for hematuria.   Musculoskeletal:  Positive for back pain. Negative for arthralgias.   Skin:  Negative for color change and pallor.   Neurological:  Negative for syncope.   Psychiatric/Behavioral:  Negative for confusion.        Objective   /70 (BP Location: Right arm, Patient Position: Sitting)   Pulse 85   Temp 36.4 °C (97.5 °F)   Wt 84.8 kg (187 lb)   BMI 24.34 kg/m²   BSA Body surface area is 2.1 meters squared.      Physical Exam  Constitutional:       General: He is not in acute distress.     Appearance: Normal appearance. He is not toxic-appearing.   HENT:      Head: Normocephalic.      Right Ear: Tympanic membrane, ear canal and external ear normal.      Left Ear: Tympanic membrane, ear canal and external ear normal.   Eyes:      Conjunctiva/sclera: Conjunctivae normal.      Pupils: Pupils are equal, round, and reactive to light.   Cardiovascular:      Rate and Rhythm: " Normal rate and regular rhythm.      Pulses: Normal pulses.      Heart sounds: Normal heart sounds.   Pulmonary:      Effort: No respiratory distress.      Breath sounds: No wheezing, rhonchi or rales.   Abdominal:      General: Bowel sounds are normal. There is no distension.      Palpations: Abdomen is soft.      Tenderness: There is no abdominal tenderness. There is right CVA tenderness. There is no left CVA tenderness.   Musculoskeletal:         General: No swelling or tenderness.   Skin:     Findings: No lesion or rash.   Neurological:      General: No focal deficit present.      Mental Status: He is alert and oriented to person, place, and time. Mental status is at baseline.      Gait: Gait normal.   Psychiatric:         Mood and Affect: Mood normal.         Behavior: Behavior normal.         Thought Content: Thought content normal.         Judgment: Judgment normal.       Lab on 09/03/2024   Component Date Value Ref Range Status    WBC 09/03/2024 5.9  4.4 - 11.3 x10*3/uL Final    nRBC 09/03/2024 0.0  0.0 - 0.0 /100 WBCs Final    RBC 09/03/2024 4.94  4.50 - 5.90 x10*6/uL Final    Hemoglobin 09/03/2024 15.8  13.5 - 17.5 g/dL Final    Hematocrit 09/03/2024 49.0  41.0 - 52.0 % Final    MCV 09/03/2024 99  80 - 100 fL Final    MCH 09/03/2024 32.0  26.0 - 34.0 pg Final    MCHC 09/03/2024 32.2  32.0 - 36.0 g/dL Final    RDW 09/03/2024 12.8  11.5 - 14.5 % Final    Platelets 09/03/2024 192  150 - 450 x10*3/uL Final    Glucose 09/03/2024 91  74 - 99 mg/dL Final    Sodium 09/03/2024 143  136 - 145 mmol/L Final    Potassium 09/03/2024 4.4  3.5 - 5.3 mmol/L Final    Chloride 09/03/2024 106  98 - 107 mmol/L Final    Bicarbonate 09/03/2024 28  21 - 32 mmol/L Final    Anion Gap 09/03/2024 13  10 - 20 mmol/L Final    Urea Nitrogen 09/03/2024 15  6 - 23 mg/dL Final    Creatinine 09/03/2024 1.10  0.50 - 1.30 mg/dL Final    eGFR 09/03/2024 67  >60 mL/min/1.73m*2 Final    Calculations of estimated GFR are performed using the 2021  CKD-EPI Study Refit equation without the race variable for the IDMS-Traceable creatinine methods.  https://jasn.asnjournals.org/content/early/2021/09/22/ASN.7584136867    Calcium 09/03/2024 10.2  8.6 - 10.6 mg/dL Final    Cholesterol 09/03/2024 247 (H)  0 - 199 mg/dL Final          Age      Desirable   Borderline High   High     0-19 Y     0 - 169       170 - 199     >/= 200    20-24 Y     0 - 189       190 - 224     >/= 225         >24 Y     0 - 199       200 - 239     >/= 240   **All ranges are based on fasting samples. Specific   therapeutic targets will vary based on patient-specific   cardiac risk.    Pediatric guidelines reference:Pediatrics 2011, 128(S5).Adult guidelines reference: NCEP ATPIII Guidelines,ANNE-MARIE 2001, 258:2486-97    Venipuncture immediately after or during the administration of Metamizole may lead to falsely low results. Testing should be performed immediately prior to Metamizole dosing.    HDL-Cholesterol 09/03/2024 89.0  mg/dL Final      Age       Very Low   Low     Normal    High    0-19 Y    < 35      < 40     40-45     ----  20-24 Y    ----     < 40      >45      ----        >24 Y      ----     < 40     40-60      >60      Cholesterol/HDL Ratio 09/03/2024 2.8   Final      Ref Values  Desirable  < 3.4  High Risk  > 5.0    LDL Calculated 09/03/2024 148 (H)  <=99 mg/dL Final                                Near   Borderline      AGE      Desirable  Optimal    High     High     Very High     0-19 Y     0 - 109     ---    110-129   >/= 130     ----    20-24 Y     0 - 119     ---    120-159   >/= 160     ----      >24 Y     0 -  99   100-129  130-159   160-189     >/=190      VLDL 09/03/2024 10  0 - 40 mg/dL Final    Triglycerides 09/03/2024 50  0 - 149 mg/dL Final       Age         Desirable   Borderline High   High     Very High   0 D-90 D    19 - 174         ----         ----        ----  91 D- 9 Y     0 -  74        75 -  99     >/= 100      ----    10-19 Y     0 -  89        90 - 129     >/=  130      ----    20-24 Y     0 - 114       115 - 149     >/= 150      ----         >24 Y     0 - 149       150 - 199    200- 499    >/= 500    Venipuncture immediately after or during the administration of Metamizole may lead to falsely low results. Testing should be performed immediately prior to Metamizole dosing.    Non HDL Cholesterol 09/03/2024 158 (H)  0 - 149 mg/dL Final          Age       Desirable   Borderline High   High     Very High     0-19 Y     0 - 119       120 - 144     >/= 145    >/= 160    20-24 Y     0 - 149       150 - 189     >/= 190      ----         >24 Y    30 mg/dL above LDL Cholesterol goal      Prostate Specific Antigen,Screen 09/03/2024 <0.10  <=4.00 ng/mL Final    Lipase 09/03/2024 12  9 - 82 U/L Final     Medications Ordered Prior to Encounter[1]  No images are attached to the encounter.            Assessment/Plan   Diagnoses and all orders for this visit:  Right flank pain  -     CT abdomen pelvis wo IV contrast; Future  -     POCT UA Automated manually resulted  Hematuria, unspecified type  -     CT abdomen pelvis wo IV contrast; Future  -     POCT UA Automated manually resulted    1.  Patient have stat CT abdomen and pelvis  2.  Patient may need to follow-up with urologist  3.  Patient to call for questions or concern            [1]   Current Outpatient Medications on File Prior to Visit   Medication Sig Dispense Refill    ascorbic acid (Vitamin C) 250 mg tablet Take 1 tablet (250 mg) by mouth once daily.      C.I. acid blue 90 (BRILLIANT BLUE G MISC)       clopidogrel (Plavix) 75 mg tablet Take 1 tablet (75 mg) by mouth once daily. 90 tablet 0    COQ10, UBIQUINOL, ORAL Take by mouth.      glucosamine chen 2KCl-chondroit 500-400 mg tablet Take 1 tablet by mouth 3 times a day.      NON FORMULARY JUICE PLUS      red yeast rice 600 mg capsule Take by mouth.      triamcinolone (Kenalog) 0.1 % cream Apply topically 2 times a day. Apply to affected area 1-2 times daily as needed. Avoid face  and groin. 30 g 0    turmeric 400 mg capsule Take by mouth.       No current facility-administered medications on file prior to visit.

## 2025-05-30 LAB — BACTERIA UR CULT: NORMAL

## 2025-08-04 DIAGNOSIS — E78.5 HYPERLIPIDEMIA, UNSPECIFIED HYPERLIPIDEMIA TYPE: ICD-10-CM

## 2025-08-04 DIAGNOSIS — R03.0 ELEVATED BP WITHOUT DIAGNOSIS OF HYPERTENSION: ICD-10-CM

## 2025-08-04 DIAGNOSIS — Z85.46 HISTORY OF PROSTATE CANCER: ICD-10-CM

## 2025-08-21 DIAGNOSIS — Z86.73 CHRONIC RIGHT ARTERIAL ISCHEMIC STROKE, MCA (MIDDLE CEREBRAL ARTERY): Primary | ICD-10-CM

## 2025-08-21 RX ORDER — CLOPIDOGREL BISULFATE 75 MG/1
75 TABLET ORAL DAILY
Qty: 90 TABLET | Refills: 1 | Status: SHIPPED | OUTPATIENT
Start: 2025-08-21 | End: 2026-02-17

## 2025-09-02 ENCOUNTER — APPOINTMENT (OUTPATIENT)
Dept: PRIMARY CARE | Facility: CLINIC | Age: 85
End: 2025-09-02
Payer: MEDICARE

## 2025-09-03 ENCOUNTER — APPOINTMENT (OUTPATIENT)
Dept: PRIMARY CARE | Facility: CLINIC | Age: 85
End: 2025-09-03
Payer: MEDICARE

## 2025-09-04 LAB
ALBUMIN SERPL-MCNC: 4.5 G/DL (ref 3.6–5.1)
ALP SERPL-CCNC: 84 U/L (ref 35–144)
ALT SERPL-CCNC: 20 U/L (ref 9–46)
ANION GAP SERPL CALCULATED.4IONS-SCNC: 10 MMOL/L (CALC) (ref 7–17)
APPEARANCE UR: CLEAR
AST SERPL-CCNC: 22 U/L (ref 10–35)
BACTERIA #/AREA URNS HPF: ABNORMAL /HPF
BASOPHILS # BLD AUTO: 40 CELLS/UL (ref 0–200)
BASOPHILS NFR BLD AUTO: 0.6 %
BILIRUB SERPL-MCNC: 0.7 MG/DL (ref 0.2–1.2)
BILIRUB UR QL STRIP: NEGATIVE
BUN SERPL-MCNC: 18 MG/DL (ref 7–25)
CALCIUM SERPL-MCNC: 10.3 MG/DL (ref 8.6–10.3)
CHLORIDE SERPL-SCNC: 108 MMOL/L (ref 98–110)
CHOLEST SERPL-MCNC: 209 MG/DL
CHOLEST/HDLC SERPL: 2.6 (CALC)
CO2 SERPL-SCNC: 25 MMOL/L (ref 20–32)
COLOR UR: YELLOW
CREAT SERPL-MCNC: 1.07 MG/DL (ref 0.7–1.22)
EGFRCR SERPLBLD CKD-EPI 2021: 68 ML/MIN/1.73M2
EOSINOPHIL # BLD AUTO: 221 CELLS/UL (ref 15–500)
EOSINOPHIL NFR BLD AUTO: 3.3 %
ERYTHROCYTE [DISTWIDTH] IN BLOOD BY AUTOMATED COUNT: 11.9 % (ref 11–15)
GLUCOSE SERPL-MCNC: 97 MG/DL (ref 65–99)
GLUCOSE UR QL STRIP: NEGATIVE
HCT VFR BLD AUTO: 44 % (ref 38.5–50)
HDLC SERPL-MCNC: 81 MG/DL
HGB BLD-MCNC: 14.8 G/DL (ref 13.2–17.1)
HGB UR QL STRIP: NEGATIVE
HYALINE CASTS #/AREA URNS LPF: ABNORMAL /LPF
KETONES UR QL STRIP: ABNORMAL
LDLC SERPL CALC-MCNC: 114 MG/DL (CALC)
LEUKOCYTE ESTERASE UR QL STRIP: ABNORMAL
LYMPHOCYTES # BLD AUTO: 858 CELLS/UL (ref 850–3900)
LYMPHOCYTES NFR BLD AUTO: 12.8 %
MCH RBC QN AUTO: 33.2 PG (ref 27–33)
MCHC RBC AUTO-ENTMCNC: 33.6 G/DL (ref 32–36)
MCV RBC AUTO: 98.7 FL (ref 80–100)
MONOCYTES # BLD AUTO: 610 CELLS/UL (ref 200–950)
MONOCYTES NFR BLD AUTO: 9.1 %
NEUTROPHILS # BLD AUTO: 4971 CELLS/UL (ref 1500–7800)
NEUTROPHILS NFR BLD AUTO: 74.2 %
NITRITE UR QL STRIP: NEGATIVE
NONHDLC SERPL-MCNC: 128 MG/DL (CALC)
PH UR STRIP: 6.5 [PH] (ref 5–8)
PLATELET # BLD AUTO: 186 THOUSAND/UL (ref 140–400)
PMV BLD REES-ECKER: 11.5 FL (ref 7.5–12.5)
POTASSIUM SERPL-SCNC: 4.2 MMOL/L (ref 3.5–5.3)
PROT SERPL-MCNC: 6.9 G/DL (ref 6.1–8.1)
PROT UR QL STRIP: ABNORMAL
PSA SERPL-MCNC: 0.07 NG/ML
RBC # BLD AUTO: 4.46 MILLION/UL (ref 4.2–5.8)
RBC #/AREA URNS HPF: ABNORMAL /HPF
SERVICE CMNT-IMP: ABNORMAL
SODIUM SERPL-SCNC: 143 MMOL/L (ref 135–146)
SP GR UR STRIP: 1.02 (ref 1–1.03)
SQUAMOUS #/AREA URNS HPF: ABNORMAL /HPF
TRIGL SERPL-MCNC: 57 MG/DL
TSH SERPL-ACNC: 1.77 MIU/L (ref 0.4–4.5)
WBC # BLD AUTO: 6.7 THOUSAND/UL (ref 3.8–10.8)
WBC #/AREA URNS HPF: ABNORMAL /HPF

## 2025-09-05 ENCOUNTER — APPOINTMENT (OUTPATIENT)
Dept: PRIMARY CARE | Facility: CLINIC | Age: 85
End: 2025-09-05
Payer: MEDICARE

## 2025-09-06 ENCOUNTER — OFFICE VISIT (OUTPATIENT)
Dept: PRIMARY CARE | Facility: CLINIC | Age: 85
End: 2025-09-06
Payer: MEDICARE

## 2025-09-06 VITALS
HEART RATE: 88 BPM | TEMPERATURE: 97.1 F | BODY MASS INDEX: 23.95 KG/M2 | DIASTOLIC BLOOD PRESSURE: 84 MMHG | SYSTOLIC BLOOD PRESSURE: 128 MMHG | WEIGHT: 184 LBS | OXYGEN SATURATION: 99 %

## 2025-09-06 DIAGNOSIS — J40 BRONCHITIS: Primary | ICD-10-CM

## 2025-09-06 DIAGNOSIS — D75.89 MACROCYTOSIS: ICD-10-CM

## 2025-09-06 DIAGNOSIS — J06.9 UPPER RESPIRATORY TRACT INFECTION, UNSPECIFIED TYPE: ICD-10-CM

## 2025-09-06 RX ORDER — BENZONATATE 200 MG/1
200 CAPSULE ORAL 3 TIMES DAILY PRN
Qty: 42 CAPSULE | Refills: 0 | Status: SHIPPED | OUTPATIENT
Start: 2025-09-06 | End: 2025-10-06

## 2025-09-06 RX ORDER — ALBUTEROL SULFATE 90 UG/1
2 INHALANT RESPIRATORY (INHALATION) EVERY 4 HOURS PRN
Qty: 8 G | Refills: 5 | Status: SHIPPED | OUTPATIENT
Start: 2025-09-06 | End: 2026-09-06

## 2025-09-06 RX ORDER — AZITHROMYCIN 250 MG/1
TABLET, FILM COATED ORAL
Qty: 6 TABLET | Refills: 0 | Status: SHIPPED | OUTPATIENT
Start: 2025-09-06 | End: 2025-09-11

## 2025-09-06 ASSESSMENT — ENCOUNTER SYMPTOMS
APPETITE CHANGE: 0
RHINORRHEA: 1
FATIGUE: 0
SORE THROAT: 0
CARDIOVASCULAR NEGATIVE: 1
RECTAL PAIN: 0
TROUBLE SWALLOWING: 0
DEPRESSION: 0
PALPITATIONS: 0
ABDOMINAL PAIN: 0
COUGH: 1
HEMATURIA: 0
CONSTIPATION: 0
OCCASIONAL FEELINGS OF UNSTEADINESS: 0
STRIDOR: 0
LOSS OF SENSATION IN FEET: 0
EYE ITCHING: 0
CHEST TIGHTNESS: 0
FEVER: 0
WHEEZING: 1
EYE REDNESS: 0

## 2025-09-07 LAB — QUEST FLEXITEST2 RESULTS:: NORMAL

## 2025-09-12 ENCOUNTER — APPOINTMENT (OUTPATIENT)
Dept: PRIMARY CARE | Facility: CLINIC | Age: 85
End: 2025-09-12
Payer: MEDICARE